# Patient Record
Sex: MALE | Race: BLACK OR AFRICAN AMERICAN | NOT HISPANIC OR LATINO | Employment: UNEMPLOYED | URBAN - METROPOLITAN AREA
[De-identification: names, ages, dates, MRNs, and addresses within clinical notes are randomized per-mention and may not be internally consistent; named-entity substitution may affect disease eponyms.]

---

## 2019-11-21 ENCOUNTER — HISTORICAL (OUTPATIENT)
Dept: LAB | Facility: HOSPITAL | Age: 64
End: 2019-11-21

## 2019-11-21 LAB
ABS NEUT (OLG): 2.49 X10(3)/MCL (ref 2.1–9.2)
ALBUMIN SERPL-MCNC: 4.3 GM/DL (ref 3.4–5)
ALBUMIN/GLOB SERPL: 1.3 {RATIO}
ALP SERPL-CCNC: 77 UNIT/L (ref 50–136)
ALT SERPL-CCNC: 20 UNIT/L (ref 12–78)
APPEARANCE, UA: CLEAR
AST SERPL-CCNC: 99 UNIT/L (ref 15–37)
BACTERIA SPEC CULT: NORMAL /HPF
BASOPHILS # BLD AUTO: 0.1 X10(3)/MCL (ref 0–0.2)
BASOPHILS NFR BLD AUTO: 1 %
BILIRUB SERPL-MCNC: 0.5 MG/DL (ref 0.2–1)
BILIRUB UR QL STRIP: NEGATIVE
BILIRUBIN DIRECT+TOT PNL SERPL-MCNC: 0.1 MG/DL (ref 0–0.2)
BILIRUBIN DIRECT+TOT PNL SERPL-MCNC: 0.4 MG/DL (ref 0–0.8)
BUN SERPL-MCNC: 12 MG/DL (ref 7–18)
CALCIUM SERPL-MCNC: 9.7 MG/DL (ref 8.5–10.1)
CHLORIDE SERPL-SCNC: 104 MMOL/L (ref 98–107)
CHOLEST SERPL-MCNC: 184 MG/DL (ref 0–200)
CHOLEST/HDLC SERPL: 3.4 {RATIO} (ref 0–5)
CO2 SERPL-SCNC: 29 MMOL/L (ref 21–32)
COLOR UR: YELLOW
CREAT SERPL-MCNC: 0.82 MG/DL (ref 0.7–1.3)
DEPRECATED CALCIDIOL+CALCIFEROL SERPL-MC: 11.32 NG/ML (ref 30–80)
EOSINOPHIL # BLD AUTO: 0.1 X10(3)/MCL (ref 0–0.9)
EOSINOPHIL NFR BLD AUTO: 2 %
ERYTHROCYTE [DISTWIDTH] IN BLOOD BY AUTOMATED COUNT: 21.5 % (ref 11.5–17)
EST. AVERAGE GLUCOSE BLD GHB EST-MCNC: 80 MG/DL
GLOBULIN SER-MCNC: 3.2 GM/DL (ref 2.4–3.5)
GLUCOSE (UA): NEGATIVE
GLUCOSE SERPL-MCNC: 96 MG/DL (ref 74–106)
HBA1C MFR BLD: 4.4 % (ref 4.2–6.3)
HCT VFR BLD AUTO: 37.9 % (ref 42–52)
HDLC SERPL-MCNC: 54 MG/DL (ref 35–60)
HGB BLD-MCNC: 10.7 GM/DL (ref 14–18)
HGB UR QL STRIP: NEGATIVE
KETONES UR QL STRIP: NEGATIVE
LDLC SERPL CALC-MCNC: 109 MG/DL (ref 0–129)
LEUKOCYTE ESTERASE UR QL STRIP: NEGATIVE
LYMPHOCYTES # BLD AUTO: 1.7 X10(3)/MCL (ref 0.6–4.6)
LYMPHOCYTES NFR BLD AUTO: 34 %
MCH RBC QN AUTO: 25.5 PG (ref 27–31)
MCHC RBC AUTO-ENTMCNC: 28.2 GM/DL (ref 33–36)
MCV RBC AUTO: 90.5 FL (ref 80–94)
MONOCYTES # BLD AUTO: 0.6 X10(3)/MCL (ref 0.1–1.3)
MONOCYTES NFR BLD AUTO: 11 %
NEUTROPHILS # BLD AUTO: 2.49 X10(3)/MCL (ref 2.1–9.2)
NEUTROPHILS NFR BLD AUTO: 51 %
NITRITE UR QL STRIP: NEGATIVE
PH UR STRIP: 6 [PH] (ref 5–9)
PLATELET # BLD AUTO: 266 X10(3)/MCL (ref 130–400)
PMV BLD AUTO: 10.7 FL (ref 9.4–12.4)
POTASSIUM SERPL-SCNC: 4.1 MMOL/L (ref 3.5–5.1)
PROT SERPL-MCNC: 7.5 GM/DL (ref 6.4–8.2)
PROT UR QL STRIP: NEGATIVE
PSA SERPL-MCNC: 0.39 NG/ML (ref 0–4)
RBC # BLD AUTO: 4.19 X10(6)/MCL (ref 4.7–6.1)
RBC #/AREA URNS HPF: NORMAL /[HPF]
SODIUM SERPL-SCNC: 138 MMOL/L (ref 136–145)
SP GR UR STRIP: 1.01 (ref 1–1.03)
SQUAMOUS EPITHELIAL, UA: NORMAL
TRIGL SERPL-MCNC: 104 MG/DL (ref 30–150)
TSH SERPL-ACNC: 1.51 MIU/L (ref 0.36–3.74)
UROBILINOGEN UR STRIP-ACNC: 0.2
VLDLC SERPL CALC-MCNC: 21 MG/DL
WBC # SPEC AUTO: 4.9 X10(3)/MCL (ref 4.5–11.5)
WBC #/AREA URNS HPF: NORMAL /HPF

## 2020-01-02 ENCOUNTER — HISTORICAL (OUTPATIENT)
Dept: LAB | Facility: HOSPITAL | Age: 65
End: 2020-01-02

## 2020-01-02 LAB
ALBUMIN SERPL-MCNC: 4 GM/DL (ref 3.4–5)
ALBUMIN/GLOB SERPL: 1.2 {RATIO}
ALP SERPL-CCNC: 75 UNIT/L (ref 50–136)
ALT SERPL-CCNC: 19 UNIT/L (ref 12–78)
AST SERPL-CCNC: 94 UNIT/L (ref 15–37)
BILIRUB SERPL-MCNC: 0.4 MG/DL (ref 0.2–1)
BILIRUBIN DIRECT+TOT PNL SERPL-MCNC: 0.1 MG/DL (ref 0–0.2)
BILIRUBIN DIRECT+TOT PNL SERPL-MCNC: 0.3 MG/DL (ref 0–0.8)
BUN SERPL-MCNC: 20 MG/DL (ref 7–18)
CALCIUM SERPL-MCNC: 9.3 MG/DL (ref 8.5–10.1)
CHLORIDE SERPL-SCNC: 108 MMOL/L (ref 98–107)
CO2 SERPL-SCNC: 29 MMOL/L (ref 21–32)
CREAT SERPL-MCNC: 0.96 MG/DL (ref 0.7–1.3)
GLOBULIN SER-MCNC: 3.3 GM/DL (ref 2.4–3.5)
GLUCOSE SERPL-MCNC: 90 MG/DL (ref 74–106)
POTASSIUM SERPL-SCNC: 4.3 MMOL/L (ref 3.5–5.1)
PROT SERPL-MCNC: 7.3 GM/DL (ref 6.4–8.2)
SODIUM SERPL-SCNC: 141 MMOL/L (ref 136–145)

## 2020-07-09 LAB
ALBUMIN SERPL-MCNC: 5 G/DL (ref 3.8–4.8)
ALBUMIN/GLOB SERPL: 1.8 {RATIO} (ref 1.2–2.2)
ALP SERPL-CCNC: 64 IU/L (ref 39–117)
ALT SERPL-CCNC: 10 IU/L (ref 0–44)
AST SERPL-CCNC: 42 IU/L (ref 0–40)
BASOPHILS # BLD AUTO: 0.1 X10E3/UL (ref 0–0.2)
BASOPHILS NFR BLD AUTO: 1 %
BILIRUB SERPL-MCNC: 0.3 MG/DL (ref 0–1.2)
BUN SERPL-MCNC: 13 MG/DL (ref 8–27)
CALCIUM SERPL-MCNC: 9.5 MG/DL (ref 8.6–10.2)
CHLORIDE SERPL-SCNC: 101 MMOL/L (ref 96–106)
CO2 SERPL-SCNC: 24 MMOL/L (ref 20–29)
CREAT SERPL-MCNC: 0.9 MG/DL (ref 0.76–1.27)
DEPRECATED CALCIDIOL+CALCIFEROL SERPL-MC: 26.2 NG/ML (ref 30–100)
EOSINOPHIL # BLD AUTO: 0.1 X10E3/UL (ref 0–0.4)
EOSINOPHIL NFR BLD AUTO: 2 %
ERYTHROCYTE [DISTWIDTH] IN BLOOD BY AUTOMATED COUNT: 15.9 % (ref 11.6–15.4)
GLOBULIN SER-MCNC: 2.8 G/DL (ref 1.5–4.5)
HCT VFR BLD AUTO: 37.9 % (ref 37.5–51)
HEP C VIRUS AB: <0.1
HGB BLD-MCNC: 11.1 G/DL (ref 13–17.7)
IRON SERPL-MCNC: 40 MCG/DL (ref 38–169)
LYMPHOCYTES # BLD AUTO: 2 X10E3/UL (ref 0.7–3.1)
LYMPHOCYTES NFR BLD AUTO: 34 %
MCH RBC QN AUTO: 28.5 PG (ref 26.6–33)
MCHC RBC AUTO-ENTMCNC: 29.3 G/DL (ref 31.5–35.7)
MCV RBC AUTO: 97 FL (ref 79–97)
MONOCYTES # BLD AUTO: 0.7 X10E3/UL (ref 0.1–0.9)
MONOCYTES NFR BLD AUTO: 12 %
NEUTROPHILS # BLD AUTO: 2.9 X10E3/UL (ref 1.4–7)
NEUTROPHILS NFR BLD AUTO: 51 %
PLATELET # BLD AUTO: 275 X10E3/UL (ref 150–450)
POTASSIUM SERPL-SCNC: 4.6 MMOL/L (ref 3.5–5.2)
PROT SERPL-MCNC: 7.8 G/DL (ref 6–8.5)
RBC # BLD AUTO: 3.9 X10(6)/MCL (ref 4.14–5.8)
SODIUM SERPL-SCNC: 140 MMOL/L (ref 134–144)
WBC # SPEC AUTO: 5.7 X10E3/UL (ref 3.4–10.8)

## 2020-08-19 ENCOUNTER — HISTORICAL (OUTPATIENT)
Dept: RADIOLOGY | Facility: HOSPITAL | Age: 65
End: 2020-08-19

## 2022-04-10 ENCOUNTER — HISTORICAL (OUTPATIENT)
Dept: ADMINISTRATIVE | Facility: HOSPITAL | Age: 67
End: 2022-04-10
Payer: MEDICARE

## 2022-04-24 VITALS
DIASTOLIC BLOOD PRESSURE: 76 MMHG | WEIGHT: 127 LBS | OXYGEN SATURATION: 98 % | SYSTOLIC BLOOD PRESSURE: 120 MMHG | HEIGHT: 68 IN | BODY MASS INDEX: 19.25 KG/M2

## 2022-05-05 ENCOUNTER — HISTORICAL (OUTPATIENT)
Dept: ADMINISTRATIVE | Facility: HOSPITAL | Age: 67
End: 2022-05-05
Payer: MEDICARE

## 2022-05-19 RX ORDER — PROMETHAZINE HYDROCHLORIDE AND DEXTROMETHORPHAN HYDROBROMIDE 6.25; 15 MG/5ML; MG/5ML
5 SYRUP ORAL EVERY 6 HOURS PRN
Qty: 120 ML | Refills: 1 | OUTPATIENT
Start: 2022-05-19

## 2022-05-19 RX ORDER — PROMETHAZINE HYDROCHLORIDE AND DEXTROMETHORPHAN HYDROBROMIDE 6.25; 15 MG/5ML; MG/5ML
5 SYRUP ORAL EVERY 6 HOURS PRN
COMMUNITY
End: 2022-05-23 | Stop reason: SDUPTHER

## 2022-05-19 NOTE — TELEPHONE ENCOUNTER
----- Message from Ness Barr sent at 5/19/2022 10:24 AM CDT -----  Patient requesting the cough medicine he uses called out to the pharmacy, unable to tell me the name

## 2022-05-19 NOTE — TELEPHONE ENCOUNTER
RX not refilled. Needs an appt if this is required. He can try OTC dextromethorphan/Doxylamine cough syrup.

## 2022-05-23 ENCOUNTER — CLINICAL SUPPORT (OUTPATIENT)
Dept: FAMILY MEDICINE | Facility: CLINIC | Age: 67
End: 2022-05-23
Payer: MEDICARE

## 2022-05-23 DIAGNOSIS — Z53.21 PROCEDURE AND TREATMENT NOT CARRIED OUT DUE TO PATIENT LEAVING PRIOR TO BEING SEEN BY HEALTH CARE PROVIDER: Primary | ICD-10-CM

## 2022-05-23 RX ORDER — CYCLOBENZAPRINE HCL 5 MG
5 TABLET ORAL NIGHTLY PRN
COMMUNITY
Start: 2021-09-01 | End: 2023-01-13 | Stop reason: SDUPTHER

## 2022-05-23 RX ORDER — HYDROCODONE BITARTRATE AND ACETAMINOPHEN 7.5; 325 MG/1; MG/1
1 TABLET ORAL EVERY 12 HOURS PRN
COMMUNITY
Start: 2022-01-27 | End: 2022-05-23 | Stop reason: SDUPTHER

## 2022-05-23 RX ORDER — FERROUS GLUCONATE 324(38)MG
324 TABLET ORAL
COMMUNITY

## 2022-05-23 RX ORDER — ASPIRIN 81 MG/1
81 TABLET ORAL DAILY
COMMUNITY

## 2022-05-23 RX ORDER — CHOLECALCIFEROL (VITAMIN D3) 50 MCG
TABLET ORAL DAILY
COMMUNITY

## 2022-05-23 RX ORDER — CLOPIDOGREL BISULFATE 75 MG/1
75 TABLET ORAL DAILY
COMMUNITY

## 2022-05-24 RX ORDER — PROMETHAZINE HYDROCHLORIDE AND DEXTROMETHORPHAN HYDROBROMIDE 6.25; 15 MG/5ML; MG/5ML
5 SYRUP ORAL EVERY 6 HOURS PRN
Qty: 180 ML | Refills: 0 | Status: SHIPPED | OUTPATIENT
Start: 2022-05-24 | End: 2022-06-29 | Stop reason: SDUPTHER

## 2022-05-24 RX ORDER — HYDROCODONE BITARTRATE AND ACETAMINOPHEN 7.5; 325 MG/1; MG/1
1 TABLET ORAL EVERY 12 HOURS PRN
Qty: 60 TABLET | Refills: 0 | Status: SHIPPED | OUTPATIENT
Start: 2022-05-24 | End: 2022-06-21 | Stop reason: SDUPTHER

## 2022-06-21 RX ORDER — HYDROCODONE BITARTRATE AND ACETAMINOPHEN 7.5; 325 MG/1; MG/1
1 TABLET ORAL EVERY 12 HOURS PRN
Qty: 60 TABLET | Refills: 0 | Status: SHIPPED | OUTPATIENT
Start: 2022-06-21 | End: 2022-07-20 | Stop reason: SDUPTHER

## 2022-06-29 RX ORDER — PROMETHAZINE HYDROCHLORIDE AND DEXTROMETHORPHAN HYDROBROMIDE 6.25; 15 MG/5ML; MG/5ML
5 SYRUP ORAL EVERY 6 HOURS PRN
Qty: 180 ML | Refills: 0 | Status: SHIPPED | OUTPATIENT
Start: 2022-06-29 | End: 2022-07-18 | Stop reason: SDUPTHER

## 2022-06-29 NOTE — TELEPHONE ENCOUNTER
----- Message from Annabel Jovel Patient Care Assistant sent at 6/29/2022  1:56 PM CDT -----  Regarding: med ref  Type:  RX Refill Request    Who Called: pt  Refill or New Rx: refill  RX Name and Strength: promethazine-dextromethorphan (PROMETHAZINE-DM) 6.25-15 mg/5 mL Syrp  How is the patient currently taking it? (ex. 1XDay):  Take 5 mLs by mouth every 6 (six) hours as needed  Is this a 30 day or 90 day RX: n/a  Preferred Pharmacy with phone number: [facility]  Local or Mail Order: Local  Ordering Provider: Dr. Montoya  Would the patient rather a call back or a response via [facility]? C/b  Best Call Back Number: [phone]  Additional Information: pt is req a refill on this cough medicine please.

## 2022-07-14 RX ORDER — PROMETHAZINE HYDROCHLORIDE AND DEXTROMETHORPHAN HYDROBROMIDE 6.25; 15 MG/5ML; MG/5ML
5 SYRUP ORAL EVERY 6 HOURS PRN
Qty: 180 ML | Refills: 0 | OUTPATIENT
Start: 2022-07-14

## 2022-07-18 DIAGNOSIS — R05.3 CHRONIC COUGH: Primary | ICD-10-CM

## 2022-07-18 RX ORDER — PROMETHAZINE HYDROCHLORIDE AND DEXTROMETHORPHAN HYDROBROMIDE 6.25; 15 MG/5ML; MG/5ML
5 SYRUP ORAL EVERY 6 HOURS PRN
Qty: 180 ML | Refills: 0 | Status: SHIPPED | OUTPATIENT
Start: 2022-07-18 | End: 2023-01-13 | Stop reason: SDUPTHER

## 2022-07-18 NOTE — TELEPHONE ENCOUNTER
----- Message from Gisela Rabago sent at 7/18/2022 11:02 AM CDT -----  Regarding: Pt call  Pt requesting a call back in ref to a Rx for cough medication. Informed pt he will need a appt. He stated no he does not he wants to speak with the nurse. Call back number is [phone]

## 2022-07-20 RX ORDER — HYDROCODONE BITARTRATE AND ACETAMINOPHEN 7.5; 325 MG/1; MG/1
1 TABLET ORAL EVERY 12 HOURS PRN
Qty: 60 TABLET | Refills: 0 | Status: SHIPPED | OUTPATIENT
Start: 2022-07-20 | End: 2022-08-15 | Stop reason: SDUPTHER

## 2022-07-20 NOTE — TELEPHONE ENCOUNTER
----- Message from Gisela Rabago sent at 7/20/2022  1:38 PM CDT -----  Regarding: Med refill  Pt is requesting pain meds to be sent to pharmacy.

## 2022-08-15 DIAGNOSIS — R05.3 CHRONIC COUGH: Primary | ICD-10-CM

## 2022-08-15 RX ORDER — HYDROCODONE BITARTRATE AND ACETAMINOPHEN 7.5; 325 MG/1; MG/1
1 TABLET ORAL EVERY 12 HOURS PRN
Qty: 60 TABLET | Refills: 0 | Status: SHIPPED | OUTPATIENT
Start: 2022-08-15 | End: 2022-09-16 | Stop reason: SDUPTHER

## 2022-08-15 NOTE — TELEPHONE ENCOUNTER
----- Message from De'Giana Rob sent at 8/15/2022 10:36 AM CDT -----  Regarding: rx request  Type:  RX Refill Request    Who Called: pt  Refill or New Rx: refill  RX Name and Strength: HYDROcodone-acetaminophen (NORCO) 7.5-325 mg per tablet  How is the patient currently taking it? (ex. 1XDay): Take 1 tablet by mouth every 12 (twelve) hours as needed.  Is this a 30 day or 90 day RX: 60 tabs   Preferred Pharmacy with phone number: [facility] [place], [place] - [address] [place] [address]  Local or Mail Order: local  Ordering Provider: ade  Would the patient rather a call back or a response via [facility]?  na  Best Call Back Number: [phone]  Additional Information: requesting cold medicine for his cough / congestion.. stated it was a yellow one that he received last time

## 2022-09-16 RX ORDER — HYDROCODONE BITARTRATE AND ACETAMINOPHEN 7.5; 325 MG/1; MG/1
1 TABLET ORAL EVERY 12 HOURS PRN
Qty: 60 TABLET | Refills: 0 | Status: SHIPPED | OUTPATIENT
Start: 2022-09-16 | End: 2022-10-11 | Stop reason: SDUPTHER

## 2022-09-16 NOTE — TELEPHONE ENCOUNTER
----- Message from Rosalinda Montoya MD sent at 9/16/2022 10:03 AM CDT -----  Regarding: RE: refill  His Rx is written for every 12 hours, which is twice a day. Thanks.   ----- Message -----  From: Shmuel Mosher MA  Sent: 9/16/2022   9:33 AM CDT  To: Rosalinda Montoya MD  Subject: FW: refill                                       Please advise on dosage change  ----- Message -----  From: Yeimi Flores  Sent: 9/16/2022   9:16 AM CDT  To: Jan PALMER Staff  Subject: refill                                           Type:  RX Refill Request    Who Called: pt  Refill or New Rx:refill  RX Name and Strength:HYDROcodone-acetaminophen (NORCO) 7.5-325 mg per tablet  How is the patient currently taking it? (ex. 1XDay):2xday  Is this a 30 day or 90 day RX:30  Preferred Pharmacy with phone number:[facility] in [place]   Local or Mail Order:local  Ordering Provider:Rosalinda Montoya  Would the patient rather a call back or a response via [facility]?   Best Call Back Number:[phone]  Additional Information: pt is out the meds. He stated that pcp was suppose to up the dosage so he has been taking 2 a day

## 2022-10-11 RX ORDER — HYDROCODONE BITARTRATE AND ACETAMINOPHEN 7.5; 325 MG/1; MG/1
1 TABLET ORAL EVERY 12 HOURS PRN
Qty: 60 TABLET | Refills: 0 | Status: SHIPPED | OUTPATIENT
Start: 2022-10-11 | End: 2022-11-17 | Stop reason: SDUPTHER

## 2022-10-11 NOTE — TELEPHONE ENCOUNTER
----- Message from Sunil Maximino sent at 10/11/2022  4:20 PM CDT -----  .Type:  RX Refill Request    Who Called: pt  Refill or New Rx: refill  RX Name and Strength:HYDROcodone-acetaminophen (NORCO) 7.5-325 mg per tablet  How is the patient currently taking it? (ex. 1XDay):Route: Take 1 tablet by mouth every 12 (twelve) hours as needed. - Oral  Is this a 30 day or 90 day RX:  Preferred Pharmacy with phone number:[facility] [place] - [address] [place] [address]  Local or Mail Order: local  Ordering Provider:  Would the patient rather a call back or a response via [facility]? Call back  Best Call Back Number: [phone]   Additional Information: patient is stating pharmacy will be closed Saturday and Sunday

## 2022-11-14 RX ORDER — HYDROCODONE BITARTRATE AND ACETAMINOPHEN 7.5; 325 MG/1; MG/1
1 TABLET ORAL EVERY 12 HOURS PRN
Qty: 60 TABLET | Refills: 0 | OUTPATIENT
Start: 2022-11-14

## 2022-11-15 NOTE — TELEPHONE ENCOUNTER
----- Message from IsraGiana Rob sent at 11/15/2022 11:53 AM CST -----  Regarding: Patient Call  Type:  Patient Returning Call    Who Called: pt  Who Left Message for Patient: nurse  Does the patient know what this is regarding?: yes  Would the patient rather a call back or a response via [facility]?  Call back   Best Call Back Number: [phone]  Additional Information:  patient requesting a call back.   Advised him he needed an appointment to get his refill, he refused and stated he will be finding himself another doctor due to lack of communication . Been waiting since last Friday for a call back

## 2022-11-17 RX ORDER — HYDROCODONE BITARTRATE AND ACETAMINOPHEN 7.5; 325 MG/1; MG/1
1 TABLET ORAL EVERY 12 HOURS PRN
Qty: 60 TABLET | Refills: 0 | Status: SHIPPED | OUTPATIENT
Start: 2022-11-17 | End: 2023-01-13 | Stop reason: SDUPTHER

## 2022-11-17 NOTE — TELEPHONE ENCOUNTER
----- Message from Sunil Maximino sent at 11/17/2022  8:33 AM CST -----  .Type:  Needs Medical Advice    Who Called: pt  Would the patient rather a call back or a response via [facility]? Call back  Best Call Back Number: [phone]  Additional Information: pt is calling to see  Today nothing available until 11/23 with Jeanie Awad. Pt's sister has an appointment today is tryignt o be seen at same time pt would not understand that it was not his appt but his sister and he believe he has an appt at that time. He said he was just gonna go to the clinic.

## 2023-01-03 RX ORDER — HYDROCODONE BITARTRATE AND ACETAMINOPHEN 7.5; 325 MG/1; MG/1
1 TABLET ORAL EVERY 12 HOURS PRN
Qty: 60 TABLET | Refills: 0 | OUTPATIENT
Start: 2023-01-03

## 2023-01-13 ENCOUNTER — OFFICE VISIT (OUTPATIENT)
Dept: FAMILY MEDICINE | Facility: CLINIC | Age: 68
End: 2023-01-13
Payer: MEDICARE

## 2023-01-13 VITALS
HEART RATE: 73 BPM | BODY MASS INDEX: 19.36 KG/M2 | OXYGEN SATURATION: 99 % | WEIGHT: 127.31 LBS | SYSTOLIC BLOOD PRESSURE: 129 MMHG | TEMPERATURE: 98 F | DIASTOLIC BLOOD PRESSURE: 83 MMHG

## 2023-01-13 DIAGNOSIS — Z12.11 SCREENING FOR COLON CANCER: ICD-10-CM

## 2023-01-13 DIAGNOSIS — G89.29 CHRONIC LEFT SHOULDER PAIN: ICD-10-CM

## 2023-01-13 DIAGNOSIS — I73.9 PERIPHERAL VASCULAR DISEASE, UNSPECIFIED: ICD-10-CM

## 2023-01-13 DIAGNOSIS — M25.512 CHRONIC LEFT SHOULDER PAIN: ICD-10-CM

## 2023-01-13 DIAGNOSIS — J44.9 CHRONIC OBSTRUCTIVE PULMONARY DISEASE, UNSPECIFIED COPD TYPE: ICD-10-CM

## 2023-01-13 DIAGNOSIS — M47.22 CERVICAL RADICULOPATHY DUE TO DEGENERATIVE JOINT DISEASE OF SPINE: Primary | ICD-10-CM

## 2023-01-13 DIAGNOSIS — Z72.0 TOBACCO USE: ICD-10-CM

## 2023-01-13 PROCEDURE — 99406 PR TOBACCO USE CESSATION INTERMEDIATE 3-10 MINUTES: ICD-10-PCS | Mod: ,,, | Performed by: FAMILY MEDICINE

## 2023-01-13 PROCEDURE — 1160F PR REVIEW ALL MEDS BY PRESCRIBER/CLIN PHARMACIST DOCUMENTED: ICD-10-PCS | Mod: CPTII,,, | Performed by: FAMILY MEDICINE

## 2023-01-13 PROCEDURE — 3288F PR FALLS RISK ASSESSMENT DOCUMENTED: ICD-10-PCS | Mod: CPTII,,, | Performed by: FAMILY MEDICINE

## 2023-01-13 PROCEDURE — 3288F FALL RISK ASSESSMENT DOCD: CPT | Mod: CPTII,,, | Performed by: FAMILY MEDICINE

## 2023-01-13 PROCEDURE — 1124F PR ADV CARE PLAN DISCUSSED, UNABLE/UNWILL DOC PLAN OR SURROGATE: ICD-10-PCS | Mod: CPTII,,, | Performed by: FAMILY MEDICINE

## 2023-01-13 PROCEDURE — 3079F PR MOST RECENT DIASTOLIC BLOOD PRESSURE 80-89 MM HG: ICD-10-PCS | Mod: CPTII,,, | Performed by: FAMILY MEDICINE

## 2023-01-13 PROCEDURE — 1101F PR PT FALLS ASSESS DOC 0-1 FALLS W/OUT INJ PAST YR: ICD-10-PCS | Mod: CPTII,,, | Performed by: FAMILY MEDICINE

## 2023-01-13 PROCEDURE — 3008F BODY MASS INDEX DOCD: CPT | Mod: CPTII,,, | Performed by: FAMILY MEDICINE

## 2023-01-13 PROCEDURE — 1160F RVW MEDS BY RX/DR IN RCRD: CPT | Mod: CPTII,,, | Performed by: FAMILY MEDICINE

## 2023-01-13 PROCEDURE — 99406 BEHAV CHNG SMOKING 3-10 MIN: CPT | Mod: ,,, | Performed by: FAMILY MEDICINE

## 2023-01-13 PROCEDURE — 3008F PR BODY MASS INDEX (BMI) DOCUMENTED: ICD-10-PCS | Mod: CPTII,,, | Performed by: FAMILY MEDICINE

## 2023-01-13 PROCEDURE — 3079F DIAST BP 80-89 MM HG: CPT | Mod: CPTII,,, | Performed by: FAMILY MEDICINE

## 2023-01-13 PROCEDURE — 1125F AMNT PAIN NOTED PAIN PRSNT: CPT | Mod: CPTII,,, | Performed by: FAMILY MEDICINE

## 2023-01-13 PROCEDURE — 1159F PR MEDICATION LIST DOCUMENTED IN MEDICAL RECORD: ICD-10-PCS | Mod: CPTII,,, | Performed by: FAMILY MEDICINE

## 2023-01-13 PROCEDURE — 1125F PR PAIN SEVERITY QUANTIFIED, PAIN PRESENT: ICD-10-PCS | Mod: CPTII,,, | Performed by: FAMILY MEDICINE

## 2023-01-13 PROCEDURE — 99214 OFFICE O/P EST MOD 30 MIN: CPT | Mod: ,,, | Performed by: FAMILY MEDICINE

## 2023-01-13 PROCEDURE — 1159F MED LIST DOCD IN RCRD: CPT | Mod: CPTII,,, | Performed by: FAMILY MEDICINE

## 2023-01-13 PROCEDURE — 1124F ACP DISCUSS-NO DSCNMKR DOCD: CPT | Mod: CPTII,,, | Performed by: FAMILY MEDICINE

## 2023-01-13 PROCEDURE — 3074F PR MOST RECENT SYSTOLIC BLOOD PRESSURE < 130 MM HG: ICD-10-PCS | Mod: CPTII,,, | Performed by: FAMILY MEDICINE

## 2023-01-13 PROCEDURE — 3074F SYST BP LT 130 MM HG: CPT | Mod: CPTII,,, | Performed by: FAMILY MEDICINE

## 2023-01-13 PROCEDURE — 99214 PR OFFICE/OUTPT VISIT, EST, LEVL IV, 30-39 MIN: ICD-10-PCS | Mod: ,,, | Performed by: FAMILY MEDICINE

## 2023-01-13 PROCEDURE — 1101F PT FALLS ASSESS-DOCD LE1/YR: CPT | Mod: CPTII,,, | Performed by: FAMILY MEDICINE

## 2023-01-13 RX ORDER — HYDROCODONE BITARTRATE AND ACETAMINOPHEN 7.5; 325 MG/1; MG/1
1 TABLET ORAL EVERY 12 HOURS PRN
Qty: 60 TABLET | Refills: 0 | Status: SHIPPED | OUTPATIENT
Start: 2023-01-13 | End: 2023-02-10 | Stop reason: SDUPTHER

## 2023-01-13 RX ORDER — CYCLOBENZAPRINE HCL 5 MG
5 TABLET ORAL NIGHTLY PRN
Qty: 30 TABLET | Refills: 2 | Status: SHIPPED | OUTPATIENT
Start: 2023-01-13 | End: 2023-04-13

## 2023-01-13 RX ORDER — PROMETHAZINE HYDROCHLORIDE AND DEXTROMETHORPHAN HYDROBROMIDE 6.25; 15 MG/5ML; MG/5ML
5 SYRUP ORAL EVERY 6 HOURS PRN
Qty: 180 ML | Refills: 0 | Status: SHIPPED | OUTPATIENT
Start: 2023-01-13 | End: 2023-03-08 | Stop reason: SDUPTHER

## 2023-01-13 NOTE — PROGRESS NOTES
Patient ID: [id number]     Chief Complaint: Medication Refill        HPI:     Yogesh Soto is a 67 y.o. male here today with history of chronic neck and left shoulder x > 2 years here for followup neck pain and left shoulder. He reports that he rode horses back in the day and a horse fell on him in the past. Pain is controlled with Norco p.r.n. and he feels like he is at his baseline, he needs refill of Rx today, pain level is 9/10 on pain scale in neck and 4-5/10 on pain scale in left shoulder, intermittent, worse with activity, pain is sharp and throbbing, worse with standing and raising his arm as well, he has been referred to Spine Ortho, but cannot get an appt. scheduled due to COVID-19 pandemic, he cancelled pain management appt. due to him feeling good with current regimen and wants to limit his COVID-19 exposure. He is doing well with Flexeril p.r.n., he needs refill of Rx today. He doesn't like the way the Gabapentin makes him feel, no longer takes that Rx. He has severe DJD C-spine and left rotator cuff tear, but he refuses surgery, risks of refusal discussed with patient and patient voices understanding. He has tried PT in the past without resolution of symptoms.  - Patient has a history of COPD, complaining of cough with clear sputum production x several month. He denies fever, chills, SOB, wheezing, hemoptysis, chest pain, palpitations. He is not on any inhalers at this time, supposed to be taking Trelegy. He reports promethazine DM works well, he needs Rx refill today.. Hasn't had CXR or PFTs recently. He reports tobacco use 2-3 cigarettes day x 40 years,he is not ready to quit at this time, risks of refusal discussed with patient voicing understanding, but he is interested in smoking cessation program. He does not meet the criteria for LDCT lung cancer screening.  - History of PAD s/p PTA right leg with Cardiology (Dr. Rutherford), controlled with Rx, no side effects, asymptomatic, he is requesting  referral to Cardiology in [place] because it is closer to his home.   - He needs Cologuard.  - He refuses flu vaccine, Prevnar 13, Pneumovax, Shingrix, and COVID-19 booster.  - He is scheduled for wellness visit in 03/2023 and will wait for that appointment for labs.  - Patient is without any other complaints today.     Advance Care Planning     Date: 01/13/2023  Patient did not wish or was not able to name a surrogate decision maker or provide an Advance Care Plan.          ----------------------------  Cervical radiculopathy  Insomnia  Osteopenia  PVD (peripheral vascular disease)     Past Surgical History:   Procedure Laterality Date    CEREBRAL ANGIOGRAM         Review of patient's allergies indicates:  No Known Allergies    Outpatient Medications Marked as Taking for the 1/13/23 encounter (Office Visit) with Rosalinda Montoya MD   Medication Sig Dispense Refill    [DISCONTINUED] cyclobenzaprine (FLEXERIL) 5 MG tablet Take 5 mg by mouth nightly as needed.      [DISCONTINUED] HYDROcodone-acetaminophen (NORCO) 7.5-325 mg per tablet Take 1 tablet by mouth every 12 (twelve) hours as needed. 60 tablet 0    [DISCONTINUED] promethazine-dextromethorphan (PROMETHAZINE-DM) 6.25-15 mg/5 mL Syrp Take 5 mLs by mouth every 6 (six) hours as needed. 180 mL 0       Social History     Socioeconomic History    Marital status: Single   Tobacco Use    Smoking status: Every Day     Types: Cigarettes    Smokeless tobacco: Never   Substance and Sexual Activity    Alcohol use: Yes    Drug use: Never    Sexual activity: Not Currently   Social History Narrative    ** Merged History Encounter **             Family History   Problem Relation Age of Onset    Hypertension Mother     Hypertension Brother         Subjective:       Review of Systems:    See HPI for details    Constitutional: No fever, No chills, No fatigue.   Eye: No blurring, No visual disturbances.   Ear/Nose/Mouth/Throat: No decreased hearing, No ear pain, No nasal  congestion, No sore throat.   Respiratory: No shortness of breath, Cough, No wheezing.   Cardiovascular: No chest pain, No palpitations, No peripheral edema.   Gastrointestinal: No nausea, No vomiting, No diarrhea, No constipation, No abdominal pain.   Genitourinary: No dysuria, No hematuria.   Hematology/Lymphatics: No bruising tendency, No bleeding tendency, No swollen lymph glands.   Endocrine: No excessive thirst, No polyuria, No excessive hunger.   Musculoskeletal: As per HPI; No muscle pain, No decreased range of motion.   Integumentary: No rash, No pruritus.   Neurologic: As per HPI; No abnormal balance, No confusion, No headache.   Psychiatric: No anxiety, No depression, Not suicidal, No hallucinations.     All Other ROS: Negative except as stated in HPI.       Objective:     /83 (BP Location: Left arm, Patient Position: Sitting, BP Method: Medium (Automatic))   Pulse 73   Temp 98.1 °F (36.7 °C)   Wt 57.7 kg (127 lb 4.8 oz)   SpO2 99%   BMI 19.36 kg/m²     Physical Exam    General: Alert and oriented, No acute distress.   Eye: Pupils are equal, round and reactive to light, Normal conjunctiva.   HENT: Normocephalic, Tympanic membranes are clear, Normal hearing, Oral mucosa is moist, No pharyngeal erythema.   Throat: Pharynx ( Not edematous, No exudate ).   Neck: Supple, Non-tender, No carotid bruit, No lymphadenopathy, No thyromegaly.   Respiratory: Dry cough, Lungs are clear to auscultation, Respirations are non-labored, Breath sounds are equal, Symmetrical chest wall expansion, No chest wall tenderness.   Cardiovascular: Normal rate, Regular rhythm, No murmur, Good pulses equal in all extremities, No edema.   Gastrointestinal: Soft, Non-tender, Non-distended, Normal bowel sounds, No organomegaly.   Genitourinary: No costovertebral angle tenderness.   Musculoskeletal: Normal range of motion, Normal gait. Bilateral cervical spine with mild posterior TTP, no erythema, no effusion, no deformity,  bilateral trapezius muscle spasms. Left shoulder with decreased abduction due to pain, no erythema, no effusion, no deformity, no crepitus. Bilateral lumbar with mild posterior TTP, no effusion, no deformity, bilateral paraspinal muscle spasms, bilateral straight leg raise.  Neurologic: No focal deficits, Cranial Nerves II-XII are grossly intact.   Psychiatric: Cooperative, Appropriate mood & affect, Normal judgment, Non-suicidal.   Mood and affect: Calm.   Behavior: Relaxed.   Tobacco use Counseling    Smoking Cessation History: Not obtained at triage    Discussion included:  Past attempts and outcomes: None.  Other smokers in the house: No?    Assessment:  Readiness to Quit: Not interested in quitting    Plan:  Quit date: None.  Counseling  Time spent: 3 to 10 minutes?  Topics covered:  Tobacco proof home and car-Yes?  Changing daily routines-Yes?  Dealing with urges to smoke/avoiding triggers-Yes?  Getting support- Yes?  Teach behavior skills- Yes?  Reinforce benefits- Yes?  Other-   Pharmacotherapy: Not indicated?  Education provided: Patient education selected. Offered at check out & available in the patient portal.?  Follow up plan: See orders.         Assessment:       ICD-10-CM ICD-9-CM   1. Cervical radiculopathy due to degenerative joint disease of spine  M47.22 721.0   2. Chronic left shoulder pain  M25.512 719.41    G89.29 338.29   3. Peripheral vascular disease, unspecified  I73.9 443.9   4. Chronic obstructive pulmonary disease, unspecified COPD type  J44.9 496   5. Screening for colon cancer  Z12.11 V76.51   6. Tobacco use  Z72.0 305.1        Plan:     Problem List Items Addressed This Visit          Pulmonary    Chronic obstructive pulmonary disease, unspecified COPD type    Relevant Medications    fluticasone-umeclidin-vilanter (TRELEGY ELLIPTA) 100-62.5-25 mcg DsDv    promethazine-dextromethorphan (PROMETHAZINE-DM) 6.25-15 mg/5 mL Syrp    Other Relevant Orders    X-Ray Chest PA And Lateral     Complete PFT w/ bronchodilator       Cardiac/Vascular    Peripheral vascular disease, unspecified    Relevant Orders    Ambulatory referral/consult to Cardiology     Other Visit Diagnoses       Cervical radiculopathy due to degenerative joint disease of spine    -  Primary    Relevant Medications    cyclobenzaprine (FLEXERIL) 5 MG tablet    HYDROcodone-acetaminophen (NORCO) 7.5-325 mg per tablet    Chronic left shoulder pain        Relevant Medications    cyclobenzaprine (FLEXERIL) 5 MG tablet    HYDROcodone-acetaminophen (NORCO) 7.5-325 mg per tablet    Screening for colon cancer        Relevant Orders    Cologuard Screening (Multitarget Stool DNA)    Tobacco use        Relevant Orders    Ambulatory referral/consult to Smoking Cessation Program         1. Cervical radiculopathy due to degenerative joint disease of spine  - cyclobenzaprine (FLEXERIL) 5 MG tablet; Take 1 tablet (5 mg total) by mouth nightly as needed for Muscle spasms.  Dispense: 30 tablet; Refill: 2  - HYDROcodone-acetaminophen (NORCO) 7.5-325 mg per tablet; Take 1 tablet by mouth every 12 (twelve) hours as needed for Pain.  Dispense: 60 tablet; Refill: 0  -Continue Norco 7.5mg po x q12hrs p.r.n. with close monitoring as well as Flexeril, both Rx refilled today.  reviewed today. UDS at next visit. Stretching exercises encouraged. Notify M.D. or ER if symptoms persist or worsen, SI/HI, temp greater than 100.4, or any acute illness.     2. Chronic left shoulder pain  - cyclobenzaprine (FLEXERIL) 5 MG tablet; Take 1 tablet (5 mg total) by mouth nightly as needed for Muscle spasms.  Dispense: 30 tablet; Refill: 2  - HYDROcodone-acetaminophen (NORCO) 7.5-325 mg per tablet; Take 1 tablet by mouth every 12 (twelve) hours as needed for Pain.  Dispense: 60 tablet; Refill: 0  - Same as #1.    3. Peripheral vascular disease, unspecified  - Ambulatory referral/consult to Cardiology; Future for evaluation and treatment, continue current treatment plan for  now.    4. Chronic obstructive pulmonary disease, unspecified COPD type  - fluticasone-umeclidin-vilanter (TRELEGY ELLIPTA) 100-62.5-25 mcg DsDv; Inhale 1 puff into the lungs once daily.  Dispense: 60 each; Refill: 5  - promethazine-dextromethorphan (PROMETHAZINE-DM) 6.25-15 mg/5 mL Syrp; Take 5 mLs by mouth every 6 (six) hours as needed (cough).  Dispense: 180 mL; Refill: 0  - X-Ray Chest PA And Lateral; Future  - Complete PFT w/ bronchodilator; Future  - X-Ray Chest PA And Lateral  - Complete PFT w/ bronchodilator  - CXR, PFTs ordered. Rx trial of Trelegy, Promethazine DM sent. Smoking cessation encouraged. Notify M.D. or ER if symptoms persist or worsen, chest pain, palptiations, SOB, wheezing, hemoptysis, temp greater than 100.4, or any acute illness.     5. Screening for colon cancer  - Cologuard Screening (Multitarget Stool DNA); Future  - Cologuard Screening (Multitarget Stool DNA)    6. Tobacco use  - Ambulatory referral/consult to Smoking Cessation Program; Future  - Smoking cessation encouraged.      Yogesh was seen today for medication refill.    Diagnoses and all orders for this visit:    Cervical radiculopathy due to degenerative joint disease of spine  -     cyclobenzaprine (FLEXERIL) 5 MG tablet; Take 1 tablet (5 mg total) by mouth nightly as needed for Muscle spasms.  -     HYDROcodone-acetaminophen (NORCO) 7.5-325 mg per tablet; Take 1 tablet by mouth every 12 (twelve) hours as needed for Pain.    Chronic left shoulder pain  -     cyclobenzaprine (FLEXERIL) 5 MG tablet; Take 1 tablet (5 mg total) by mouth nightly as needed for Muscle spasms.  -     HYDROcodone-acetaminophen (NORCO) 7.5-325 mg per tablet; Take 1 tablet by mouth every 12 (twelve) hours as needed for Pain.    Peripheral vascular disease, unspecified  -     Ambulatory referral/consult to Cardiology; Future    Chronic obstructive pulmonary disease, unspecified COPD type  -     fluticasone-umeclidin-vilanter (TRELEGY ELLIPTA) 100-62.5-25  mcg DsDv; Inhale 1 puff into the lungs once daily.  -     promethazine-dextromethorphan (PROMETHAZINE-DM) 6.25-15 mg/5 mL Syrp; Take 5 mLs by mouth every 6 (six) hours as needed (cough).  -     X-Ray Chest PA And Lateral; Future  -     Complete PFT w/ bronchodilator; Future  -     X-Ray Chest PA And Lateral  -     Complete PFT w/ bronchodilator    Screening for colon cancer  -     Cologuard Screening (Multitarget Stool DNA); Future  -     Cologuard Screening (Multitarget Stool DNA)    Tobacco use  -     Ambulatory referral/consult to Smoking Cessation Program; Future          Medication List with Changes/Refills   Current Medications    ASPIRIN (ECOTRIN) 81 MG EC TABLET    Take 81 mg by mouth once daily.       Start Date: --        End Date: --    CHOLECALCIFEROL, VITAMIN D3, (VITAMIN D3) 50 MCG (2,000 UNIT) TAB    Take by mouth once daily.       Start Date: --        End Date: --    CLOPIDOGREL (PLAVIX) 75 MG TABLET    Take 75 mg by mouth once daily.       Start Date: --        End Date: --    FERROUS GLUCONATE (FERGON) 324 MG TABLET    Take 324 mg by mouth daily with breakfast.       Start Date: --        End Date: --   Changed and/or Refilled Medications    Modified Medication Previous Medication    CYCLOBENZAPRINE (FLEXERIL) 5 MG TABLET cyclobenzaprine (FLEXERIL) 5 MG tablet       Take 1 tablet (5 mg total) by mouth nightly as needed for Muscle spasms.    Take 5 mg by mouth nightly as needed.       Start Date: 1/13/2023 End Date: 4/13/2023    Start Date: 9/1/2021  End Date: 1/13/2023    FLUTICASONE-UMECLIDIN-VILANTER (TRELEGY ELLIPTA) 100-62.5-25 MCG DSDV fluticasone-umeclidin-vilanter (TRELEGY ELLIPTA) 100-62.5-25 mcg DsDv       Inhale 1 puff into the lungs once daily.    Inhale 1 puff into the lungs once daily.       Start Date: 1/13/2023 End Date: 1/13/2024    Start Date: 2/24/2022 End Date: 1/13/2023    HYDROCODONE-ACETAMINOPHEN (NORCO) 7.5-325 MG PER TABLET HYDROcodone-acetaminophen (NORCO) 7.5-325 mg per  tablet       Take 1 tablet by mouth every 12 (twelve) hours as needed for Pain.    Take 1 tablet by mouth every 12 (twelve) hours as needed.       Start Date: 1/13/2023 End Date: 2/12/2023    Start Date: 11/17/2022End Date: 1/13/2023    PROMETHAZINE-DEXTROMETHORPHAN (PROMETHAZINE-DM) 6.25-15 MG/5 ML SYRP promethazine-dextromethorphan (PROMETHAZINE-DM) 6.25-15 mg/5 mL Syrp       Take 5 mLs by mouth every 6 (six) hours as needed (cough).    Take 5 mLs by mouth every 6 (six) hours as needed.       Start Date: 1/13/2023 End Date: --    Start Date: 7/18/2022 End Date: 1/13/2023          Follow up keep wellness appointment as scheduled for 03/2023 or sooner if symptoms worsen or fail to improve.

## 2023-01-25 ENCOUNTER — TELEPHONE (OUTPATIENT)
Dept: FAMILY MEDICINE | Facility: CLINIC | Age: 68
End: 2023-01-25
Payer: MEDICARE

## 2023-02-10 DIAGNOSIS — M47.22 CERVICAL RADICULOPATHY DUE TO DEGENERATIVE JOINT DISEASE OF SPINE: ICD-10-CM

## 2023-02-10 DIAGNOSIS — M25.512 CHRONIC LEFT SHOULDER PAIN: ICD-10-CM

## 2023-02-10 DIAGNOSIS — G89.29 CHRONIC LEFT SHOULDER PAIN: ICD-10-CM

## 2023-02-10 RX ORDER — HYDROCODONE BITARTRATE AND ACETAMINOPHEN 7.5; 325 MG/1; MG/1
1 TABLET ORAL EVERY 12 HOURS PRN
Qty: 60 TABLET | Refills: 0 | Status: SHIPPED | OUTPATIENT
Start: 2023-02-10 | End: 2023-03-08 | Stop reason: SDUPTHER

## 2023-02-10 NOTE — TELEPHONE ENCOUNTER
----- Message from Sapphire Faith sent at 2/10/2023  9:35 AM CST -----  Regarding: Med Refill   Pt needs a refill on [place] 7.5 MG. [facility] pharmacy in [place].

## 2023-03-08 DIAGNOSIS — M47.22 CERVICAL RADICULOPATHY DUE TO DEGENERATIVE JOINT DISEASE OF SPINE: ICD-10-CM

## 2023-03-08 DIAGNOSIS — G89.29 CHRONIC LEFT SHOULDER PAIN: ICD-10-CM

## 2023-03-08 DIAGNOSIS — M25.512 CHRONIC LEFT SHOULDER PAIN: ICD-10-CM

## 2023-03-08 DIAGNOSIS — J44.9 CHRONIC OBSTRUCTIVE PULMONARY DISEASE, UNSPECIFIED COPD TYPE: ICD-10-CM

## 2023-03-08 RX ORDER — PROMETHAZINE HYDROCHLORIDE AND DEXTROMETHORPHAN HYDROBROMIDE 6.25; 15 MG/5ML; MG/5ML
5 SYRUP ORAL EVERY 6 HOURS PRN
Qty: 180 ML | Refills: 0 | Status: SHIPPED | OUTPATIENT
Start: 2023-03-08 | End: 2023-05-08 | Stop reason: SDUPTHER

## 2023-03-08 RX ORDER — HYDROCODONE BITARTRATE AND ACETAMINOPHEN 7.5; 325 MG/1; MG/1
1 TABLET ORAL EVERY 12 HOURS PRN
Qty: 60 TABLET | Refills: 0 | Status: SHIPPED | OUTPATIENT
Start: 2023-03-08 | End: 2023-04-06 | Stop reason: SDUPTHER

## 2023-03-08 NOTE — TELEPHONE ENCOUNTER
----- Message from Zay Ceron sent at 3/8/2023 10:02 AM CST -----  .Type:  RX Refill Request    Who Called: Yogesh Guo or New Rx: Refill   RX Name and Strength: HYDROcodone-acetaminophen (NORCO) 7.5-325 mg per tablet, also he would like the cough medicine refilled as well.    How is the patient currently taking it? (ex. 1XDay):  Is this a 30 day or 90 day RX:  Preferred Pharmacy with phone number: [facility]  Local or Mail Order: Local   Ordering Provider: Jan  Would the patient rather a call back or a response via [facility]?   Best Call Back Number: [phone]  Additional Information:

## 2023-04-05 DIAGNOSIS — M47.22 CERVICAL RADICULOPATHY DUE TO DEGENERATIVE JOINT DISEASE OF SPINE: ICD-10-CM

## 2023-04-05 DIAGNOSIS — G89.29 CHRONIC LEFT SHOULDER PAIN: ICD-10-CM

## 2023-04-05 DIAGNOSIS — M25.512 CHRONIC LEFT SHOULDER PAIN: ICD-10-CM

## 2023-04-05 DIAGNOSIS — J44.9 CHRONIC OBSTRUCTIVE PULMONARY DISEASE, UNSPECIFIED COPD TYPE: ICD-10-CM

## 2023-04-05 RX ORDER — PROMETHAZINE HYDROCHLORIDE AND DEXTROMETHORPHAN HYDROBROMIDE 6.25; 15 MG/5ML; MG/5ML
5 SYRUP ORAL EVERY 6 HOURS PRN
Qty: 180 ML | Refills: 0 | OUTPATIENT
Start: 2023-04-05

## 2023-04-05 RX ORDER — HYDROCODONE BITARTRATE AND ACETAMINOPHEN 7.5; 325 MG/1; MG/1
1 TABLET ORAL EVERY 12 HOURS PRN
Qty: 60 TABLET | Refills: 0 | OUTPATIENT
Start: 2023-04-05 | End: 2023-05-05

## 2023-04-06 DIAGNOSIS — M25.512 CHRONIC LEFT SHOULDER PAIN: ICD-10-CM

## 2023-04-06 DIAGNOSIS — G89.29 CHRONIC LEFT SHOULDER PAIN: ICD-10-CM

## 2023-04-06 DIAGNOSIS — M47.22 CERVICAL RADICULOPATHY DUE TO DEGENERATIVE JOINT DISEASE OF SPINE: ICD-10-CM

## 2023-04-06 RX ORDER — HYDROCODONE BITARTRATE AND ACETAMINOPHEN 7.5; 325 MG/1; MG/1
1 TABLET ORAL EVERY 12 HOURS PRN
Qty: 60 TABLET | Refills: 0 | Status: SHIPPED | OUTPATIENT
Start: 2023-04-06 | End: 2023-05-05 | Stop reason: SDUPTHER

## 2023-04-06 RX ORDER — HYDROCODONE BITARTRATE AND ACETAMINOPHEN 7.5; 325 MG/1; MG/1
1 TABLET ORAL EVERY 12 HOURS PRN
Qty: 60 TABLET | Refills: 0 | OUTPATIENT
Start: 2023-04-06 | End: 2023-05-06

## 2023-04-11 ENCOUNTER — TELEPHONE (OUTPATIENT)
Dept: FAMILY MEDICINE | Facility: CLINIC | Age: 68
End: 2023-04-11
Payer: MEDICARE

## 2023-04-11 NOTE — TELEPHONE ENCOUNTER
----- Message from Zay Ceron sent at 4/11/2023 12:19 PM CDT -----  .Type:  Needs Medical Advice    Who Called: Yogesh   Symptoms (please be specific):    How long has patient had these symptoms:    Pharmacy name and phone #:  [facility]  Would the patient rather a call back or a response via [facility]?   Best Call Back Number: [phone]  Additional Information: Patient has a question about his medication that was sent to [place] he told me.  Please give him a call back.  The [facility] is located in [place].  clopidogreL (PLAVIX) 75 mg tablet and cholecalciferol, vitamin D3, (VITAMIN D3) 50 mcg (2,000 unit) Tab

## 2023-05-05 DIAGNOSIS — M47.22 CERVICAL RADICULOPATHY DUE TO DEGENERATIVE JOINT DISEASE OF SPINE: ICD-10-CM

## 2023-05-05 DIAGNOSIS — G89.29 CHRONIC LEFT SHOULDER PAIN: ICD-10-CM

## 2023-05-05 DIAGNOSIS — M25.512 CHRONIC LEFT SHOULDER PAIN: ICD-10-CM

## 2023-05-05 RX ORDER — HYDROCODONE BITARTRATE AND ACETAMINOPHEN 7.5; 325 MG/1; MG/1
1 TABLET ORAL EVERY 12 HOURS PRN
Qty: 60 TABLET | Refills: 0 | Status: SHIPPED | OUTPATIENT
Start: 2023-05-05 | End: 2023-05-08 | Stop reason: SDUPTHER

## 2023-05-05 NOTE — TELEPHONE ENCOUNTER
----- Message from Zay Ceron sent at 5/5/2023  8:45 AM CDT -----  .Type:  RX Refill Request    Who Called: Yogesh Guo or New Rx: Refill   RX Name and Strength:HYDROcodone-acetaminophen (NORCO) 7.5-325 mg per tablet  How is the patient currently taking it? (ex. 1XDay):  Is this a 30 day or 90 day RX:  Preferred Pharmacy with phone number: [facility]   Local or Mail Order: Local   Ordering Provider: Jan  Would the patient rather a call back or a response via [facility]?   Best Call Back Number: [phone]  Additional Information: He would like it done today.

## 2023-05-08 DIAGNOSIS — M47.22 CERVICAL RADICULOPATHY DUE TO DEGENERATIVE JOINT DISEASE OF SPINE: ICD-10-CM

## 2023-05-08 DIAGNOSIS — G89.29 CHRONIC LEFT SHOULDER PAIN: ICD-10-CM

## 2023-05-08 DIAGNOSIS — M25.512 CHRONIC LEFT SHOULDER PAIN: ICD-10-CM

## 2023-05-08 DIAGNOSIS — J44.9 CHRONIC OBSTRUCTIVE PULMONARY DISEASE, UNSPECIFIED COPD TYPE: ICD-10-CM

## 2023-05-08 RX ORDER — PROMETHAZINE HYDROCHLORIDE AND DEXTROMETHORPHAN HYDROBROMIDE 6.25; 15 MG/5ML; MG/5ML
5 SYRUP ORAL EVERY 6 HOURS PRN
Qty: 180 ML | Refills: 0 | Status: SHIPPED | OUTPATIENT
Start: 2023-05-08 | End: 2023-06-06 | Stop reason: SDUPTHER

## 2023-05-08 RX ORDER — HYDROCODONE BITARTRATE AND ACETAMINOPHEN 7.5; 325 MG/1; MG/1
1 TABLET ORAL EVERY 12 HOURS PRN
Qty: 60 TABLET | Refills: 0 | Status: SHIPPED | OUTPATIENT
Start: 2023-05-08 | End: 2023-06-06 | Stop reason: SDUPTHER

## 2023-05-08 NOTE — TELEPHONE ENCOUNTER
----- Message from Jennifer Carey sent at 5/8/2023  8:55 AM CDT -----  Regarding: med adv  Type:  Needs Medical Advice    Who Called: Yogesh  Symptoms (please be specific): Back pain  Pharmacy name and phone #:  [facility] [place], [place]  Would the patient rather a call back or a response via [facility]?   Best Call Back Number: [phone]  Additional Information: [facility] sends a generic for Norco and patient states he gets a reaction taking the generic brand. Also patient would like cough syrup sent to [facility] as well. Please resubmit [place] rx to [facility] in [place], [place]

## 2023-06-06 DIAGNOSIS — G89.29 CHRONIC LEFT SHOULDER PAIN: ICD-10-CM

## 2023-06-06 DIAGNOSIS — M47.22 CERVICAL RADICULOPATHY DUE TO DEGENERATIVE JOINT DISEASE OF SPINE: ICD-10-CM

## 2023-06-06 DIAGNOSIS — M25.512 CHRONIC LEFT SHOULDER PAIN: ICD-10-CM

## 2023-06-06 DIAGNOSIS — J44.9 CHRONIC OBSTRUCTIVE PULMONARY DISEASE, UNSPECIFIED COPD TYPE: ICD-10-CM

## 2023-06-06 RX ORDER — PROMETHAZINE HYDROCHLORIDE AND DEXTROMETHORPHAN HYDROBROMIDE 6.25; 15 MG/5ML; MG/5ML
5 SYRUP ORAL EVERY 6 HOURS PRN
Qty: 180 ML | Refills: 0 | Status: SHIPPED | OUTPATIENT
Start: 2023-06-06 | End: 2023-07-17 | Stop reason: SDUPTHER

## 2023-06-06 RX ORDER — HYDROCODONE BITARTRATE AND ACETAMINOPHEN 7.5; 325 MG/1; MG/1
1 TABLET ORAL EVERY 12 HOURS PRN
Qty: 30 TABLET | Refills: 0 | Status: SHIPPED | OUTPATIENT
Start: 2023-06-06 | End: 2023-06-21 | Stop reason: SDUPTHER

## 2023-06-06 NOTE — TELEPHONE ENCOUNTER
----- Message from Sunil Stanton sent at 6/6/2023  1:17 PM CDT -----  .Type:  RX Refill Request    Who Called: pt  Refill or New Rx:refill  RX Name and Strength:HYDROcodone-acetaminophen (NORCO) 7.5-325 mg per tablet  How is the patient currently taking it? (ex. 1XDay):Take 1 tablet by mouth every 12 (twelve) hours as needed for Pain. - Oral  Is this a 30 day or 90 day RX: 30 day  Preferred Pharmacy with phone number:[facility] [address]  Local or Mail Order:local  Ordering Provider:  Would the patient rather a call back or a response via [employer]? Call back  Best Call Back Number:  Additional Information:      .Type:  RX Refill Request    Who Called: pt  Refill or New Rx:Refill  RX Name and Strength:promethazine-dextromethorphan (PROMETHAZINE-DM) 6.25-15 mg/5 mL Syrp  How is the patient currently taking it? (ex. 1XDay):Route: Take 5 mLs by mouth every 6 (six) hours as needed (cough). - Oral  Is this a 30 day or 90 day RX:  Preferred Pharmacy with phone number:[facility] [address]  Local or Mail Order:  Ordering Provider:  Would the patient rather a call back or a response via [employer]? Call back  Best Call Back Number:  Additional Information:

## 2023-06-21 ENCOUNTER — OFFICE VISIT (OUTPATIENT)
Dept: FAMILY MEDICINE | Facility: CLINIC | Age: 68
End: 2023-06-21
Payer: MEDICARE

## 2023-06-21 VITALS
TEMPERATURE: 98 F | RESPIRATION RATE: 19 BRPM | SYSTOLIC BLOOD PRESSURE: 107 MMHG | HEART RATE: 82 BPM | DIASTOLIC BLOOD PRESSURE: 65 MMHG | OXYGEN SATURATION: 99 % | WEIGHT: 128 LBS | BODY MASS INDEX: 19.4 KG/M2 | HEIGHT: 68 IN

## 2023-06-21 DIAGNOSIS — M54.2 CERVICALGIA: ICD-10-CM

## 2023-06-21 DIAGNOSIS — Z12.11 SCREENING FOR COLON CANCER: ICD-10-CM

## 2023-06-21 DIAGNOSIS — M25.512 CHRONIC LEFT SHOULDER PAIN: ICD-10-CM

## 2023-06-21 DIAGNOSIS — J44.9 CHRONIC OBSTRUCTIVE PULMONARY DISEASE, UNSPECIFIED COPD TYPE: ICD-10-CM

## 2023-06-21 DIAGNOSIS — M47.22 CERVICAL RADICULOPATHY DUE TO DEGENERATIVE JOINT DISEASE OF SPINE: Primary | ICD-10-CM

## 2023-06-21 DIAGNOSIS — M47.26 OSTEOARTHRITIS OF SPINE WITH RADICULOPATHY, LUMBAR REGION: ICD-10-CM

## 2023-06-21 DIAGNOSIS — G89.29 CHRONIC LEFT SHOULDER PAIN: ICD-10-CM

## 2023-06-21 DIAGNOSIS — M54.16 LUMBAR RADICULOPATHY, CHRONIC: ICD-10-CM

## 2023-06-21 PROCEDURE — 1125F AMNT PAIN NOTED PAIN PRSNT: CPT | Mod: CPTII,,, | Performed by: FAMILY MEDICINE

## 2023-06-21 PROCEDURE — 1160F RVW MEDS BY RX/DR IN RCRD: CPT | Mod: CPTII,,, | Performed by: FAMILY MEDICINE

## 2023-06-21 PROCEDURE — 1159F PR MEDICATION LIST DOCUMENTED IN MEDICAL RECORD: ICD-10-PCS | Mod: CPTII,,, | Performed by: FAMILY MEDICINE

## 2023-06-21 PROCEDURE — 99214 PR OFFICE/OUTPT VISIT, EST, LEVL IV, 30-39 MIN: ICD-10-PCS | Mod: ,,, | Performed by: FAMILY MEDICINE

## 2023-06-21 PROCEDURE — 3074F SYST BP LT 130 MM HG: CPT | Mod: CPTII,,, | Performed by: FAMILY MEDICINE

## 2023-06-21 PROCEDURE — 3008F BODY MASS INDEX DOCD: CPT | Mod: CPTII,,, | Performed by: FAMILY MEDICINE

## 2023-06-21 PROCEDURE — 1101F PR PT FALLS ASSESS DOC 0-1 FALLS W/OUT INJ PAST YR: ICD-10-PCS | Mod: CPTII,,, | Performed by: FAMILY MEDICINE

## 2023-06-21 PROCEDURE — 3288F PR FALLS RISK ASSESSMENT DOCUMENTED: ICD-10-PCS | Mod: CPTII,,, | Performed by: FAMILY MEDICINE

## 2023-06-21 PROCEDURE — 3078F DIAST BP <80 MM HG: CPT | Mod: CPTII,,, | Performed by: FAMILY MEDICINE

## 2023-06-21 PROCEDURE — 99214 OFFICE O/P EST MOD 30 MIN: CPT | Mod: ,,, | Performed by: FAMILY MEDICINE

## 2023-06-21 PROCEDURE — 1101F PT FALLS ASSESS-DOCD LE1/YR: CPT | Mod: CPTII,,, | Performed by: FAMILY MEDICINE

## 2023-06-21 PROCEDURE — 3074F PR MOST RECENT SYSTOLIC BLOOD PRESSURE < 130 MM HG: ICD-10-PCS | Mod: CPTII,,, | Performed by: FAMILY MEDICINE

## 2023-06-21 PROCEDURE — 1159F MED LIST DOCD IN RCRD: CPT | Mod: CPTII,,, | Performed by: FAMILY MEDICINE

## 2023-06-21 PROCEDURE — 1160F PR REVIEW ALL MEDS BY PRESCRIBER/CLIN PHARMACIST DOCUMENTED: ICD-10-PCS | Mod: CPTII,,, | Performed by: FAMILY MEDICINE

## 2023-06-21 PROCEDURE — 3008F PR BODY MASS INDEX (BMI) DOCUMENTED: ICD-10-PCS | Mod: CPTII,,, | Performed by: FAMILY MEDICINE

## 2023-06-21 PROCEDURE — 1125F PR PAIN SEVERITY QUANTIFIED, PAIN PRESENT: ICD-10-PCS | Mod: CPTII,,, | Performed by: FAMILY MEDICINE

## 2023-06-21 PROCEDURE — 3288F FALL RISK ASSESSMENT DOCD: CPT | Mod: CPTII,,, | Performed by: FAMILY MEDICINE

## 2023-06-21 PROCEDURE — 3078F PR MOST RECENT DIASTOLIC BLOOD PRESSURE < 80 MM HG: ICD-10-PCS | Mod: CPTII,,, | Performed by: FAMILY MEDICINE

## 2023-06-21 RX ORDER — HYDROCODONE BITARTRATE AND ACETAMINOPHEN 7.5; 325 MG/1; MG/1
1 TABLET ORAL EVERY 8 HOURS PRN
Qty: 90 TABLET | Refills: 0 | Status: SHIPPED | OUTPATIENT
Start: 2023-06-21 | End: 2023-07-17 | Stop reason: SDUPTHER

## 2023-06-21 NOTE — PROGRESS NOTES
Patient ID: [id number]     Chief Complaint: Discuss Narco        HPI:     Yogesh Soto is a 68 y.o. male here today with history of chronic neck and left shoulder x > 2 years here for followup neck pain and left shoulder. He reports that he rode horses back in the day and a horse fell on him in the past. Pain is improved with Norco p.r.n. without side effects, but he has been taking Norco 3 times a day with resolution of pain, he needs refill of Rx today, pain level is 9/10 on pain scale in neck and 4-5/10 on pain scale in left shoulder, intermittent, worse with activity, pain is sharp and throbbing, worse with standing and raising his arm as well, he has been referred to Spine Ortho, but cannot get an appt. scheduled due to COVID-19 pandemic, he cancelled pain management appt. due to him feeling good with current regimen and wants to limit his COVID-19 exposure. He is doing well with Flexeril p.r.n., he needs refill of Rx today. He doesn't like the way the Gabapentin makes him feel, no longer takes that Rx. He has severe DJD C-spine and left rotator cuff tear, but he refuses surgery, risks of refusal discussed with patient and patient voices understanding. He has tried PT in the past without resolution of symptoms, but he is open to PT again.  - He also complains of LBP x several years with radiation down left leg, he has a history of DJD L-spine, he hasn't had MRI L-spine in over a year. Pain level is 9/10 on pain scale in back, intermittent, worse with activity, pain is sharp.   - Patient has COPD, complaining of cough with clear sputum production x several months. He denies fever, chills, SOB, wheezing, hemoptysis, chest pain, palpitations. He is not on any inhalers at this time, supposed to be taking Trelegy daily, but only uses it intermittently. He reports promethazine DM works well, he has Rx at home. Hasn't had CXR or PFTs recently. He reports tobacco use 2-3 cigarettes day x 40 years,he is not ready to  quit at this time, risks of refusal discussed with patient voicing understanding, but he is interested in smoking cessation program. He does not meet the criteria for LDCT lung cancer screening.  - He was due for wellness exam with labs in 03/2023, but missed his appointment, needs to reschedule next available.   - He needs Cologuard ordered.   - Patient is without any other complaints today.         ----------------------------  Cervical radiculopathy  Insomnia  Osteopenia  PVD (peripheral vascular disease)     Past Surgical History:   Procedure Laterality Date    CEREBRAL ANGIOGRAM         Review of patient's allergies indicates:  No Known Allergies    Outpatient Medications Marked as Taking for the 6/21/23 encounter (Office Visit) with Rosalinda Montoya MD   Medication Sig Dispense Refill    aspirin (ECOTRIN) 81 MG EC tablet Take 81 mg by mouth once daily.      cholecalciferol, vitamin D3, (VITAMIN D3) 50 mcg (2,000 unit) Tab Take by mouth once daily.      ferrous gluconate (FERGON) 324 MG tablet Take 324 mg by mouth daily with breakfast.      fluticasone-umeclidin-vilanter (TRELEGY ELLIPTA) 100-62.5-25 mcg DsDv Inhale 1 puff into the lungs once daily. 60 each 5    promethazine-dextromethorphan (PROMETHAZINE-DM) 6.25-15 mg/5 mL Syrp Take 5 mLs by mouth every 6 (six) hours as needed (cough). 180 mL 0    [DISCONTINUED] HYDROcodone-acetaminophen (NORCO) 7.5-325 mg per tablet Take 1 tablet by mouth every 12 (twelve) hours as needed for Pain. 30 tablet 0       Social History     Socioeconomic History    Marital status: Single   Tobacco Use    Smoking status: Every Day     Types: Cigarettes    Smokeless tobacco: Never   Substance and Sexual Activity    Alcohol use: Yes     Alcohol/week: 1.0 standard drink     Types: 1 Cans of beer per week     Comment: 1 a week    Drug use: Never    Sexual activity: Not Currently   Social History Narrative    ** Merged History Encounter **          Social Determinants of Health      Financial Resource Strain: High Risk    Difficulty of Paying Living Expenses: Hard   Food Insecurity: No Food Insecurity    Worried About Running Out of Food in the Last Year: Never true    Ran Out of Food in the Last Year: Never true   Transportation Needs: No Transportation Needs    Lack of Transportation (Medical): No    Lack of Transportation (Non-Medical): No   Physical Activity: Sufficiently Active    Days of Exercise per Week: 7 days    Minutes of Exercise per Session: 150+ min   Stress: No Stress Concern Present    Feeling of Stress : Only a little   Social Connections: Socially Isolated    Frequency of Communication with Friends and Family: More than three times a week    Frequency of Social Gatherings with Friends and Family: More than three times a week    Attends Gn[employer] Services: Never    Active Member of Clubs or Organizations: No    Attends Club or Organization Meetings: Never    Marital Status: Never    Housing Stability: Low Risk     Unable to Pay for Housing in the Last Year: No    Number of Places Lived in the Last Year: 1    Unstable Housing in the Last Year: No        Family History   Problem Relation Age of Onset    Hypertension Mother     Hypertension Brother         Subjective:       Review of Systems:    See HPI for details    Constitutional: No fever, No chills, No fatigue.   Eye: No blurring, No visual disturbances.   Ear/Nose/Mouth/Throat: No decreased hearing, No ear pain, No nasal congestion, No sore throat.   Respiratory: No shortness of breath, Cough, No wheezing.   Cardiovascular: No chest pain, No palpitations, No peripheral edema.   Gastrointestinal: No nausea, No vomiting, No diarrhea, No constipation, No abdominal pain.   Genitourinary: No dysuria, No hematuria.   Hematology/Lymphatics: No bruising tendency, No bleeding tendency, No swollen lymph glands.   Endocrine: No excessive thirst, No polyuria, No excessive hunger.   Musculoskeletal: As per HPI; No muscle pain,  "No decreased range of motion.   Integumentary: No rash, No pruritus.   Neurologic: As per HPI; No abnormal balance, No confusion, No headache.   Psychiatric: No anxiety, No depression, Not suicidal, No hallucinations.     All Other ROS: Negative except as stated in HPI.       Objective:     /65 (BP Location: Left arm, Patient Position: Sitting, BP Method: Medium (Automatic))   Pulse 82   Temp 97.7 °F (36.5 °C) (Oral)   Resp 19   Ht 5' 8" (1.727 m)   Wt 58.1 kg (128 lb)   SpO2 99%   BMI 19.46 kg/m²     Physical Exam    General: Alert and oriented, No acute distress.   Eye: Pupils are equal, round and reactive to light, Normal conjunctiva.   HENT: Normocephalic, Tympanic membranes are clear, Normal hearing, Oral mucosa is moist, No pharyngeal erythema.   Throat: Pharynx ( Not edematous, No exudate ).   Neck: Supple, Non-tender, No carotid bruit, No lymphadenopathy, No thyromegaly.   Respiratory: Dry cough, Lungs are clear to auscultation, Respirations are non-labored, Breath sounds are equal, Symmetrical chest wall expansion, No chest wall tenderness.   Cardiovascular: Normal rate, Regular rhythm, No murmur, Good pulses equal in all extremities, No edema.   Gastrointestinal: Soft, Non-tender, Non-distended, Normal bowel sounds, No organomegaly.   Genitourinary: No costovertebral angle tenderness.   Musculoskeletal: Normal range of motion, Normal gait. Bilateral cervical spine with mild posterior TTP, no erythema, no effusion, no deformity, bilateral trapezius muscle spasms. Left shoulder with decreased abduction due to pain, no erythema, no effusion, no deformity, no crepitus. Bilateral lumbar with mild posterior TTP, no effusion, no deformity, bilateral paraspinal muscle spasms, bilateral straight leg raise.  Neurologic: No focal deficits, Cranial Nerves II-XII are grossly intact.   Psychiatric: Cooperative, Appropriate mood & affect, Normal judgment, Non-suicidal.   Mood and affect: Calm.   Behavior: " Relaxed.         Assessment:       ICD-10-CM ICD-9-CM   1. Cervical radiculopathy due to degenerative joint disease of spine  M47.22 721.0   2. Chronic left shoulder pain  M25.512 719.41    G89.29 338.29   3. Osteoarthritis of spine with radiculopathy, lumbar region  M47.26 721.3   4. Chronic obstructive pulmonary disease, unspecified COPD type  J44.9 496   5. Screening for colon cancer  Z12.11 V76.51   6. Cervicalgia  M54.2 723.1   7. Lumbar radiculopathy, chronic  M54.16 724.4        Plan:     Problem List Items Addressed This Visit          Pulmonary    Chronic obstructive pulmonary disease, unspecified COPD type    Relevant Orders    X-Ray Chest PA And Lateral    Complete PFT w/ bronchodilator     Other Visit Diagnoses       Cervical radiculopathy due to degenerative joint disease of spine    -  Primary    Relevant Medications    HYDROcodone-acetaminophen (NORCO) 7.5-325 mg per tablet    Other Relevant Orders    MRI Cervical Spine Without Contrast    Ambulatory referral/consult to Physical/Occupational Therapy    Chronic left shoulder pain        Relevant Medications    HYDROcodone-acetaminophen (NORCO) 7.5-325 mg per tablet    Other Relevant Orders    MRI Shoulder Without Contrast Left    Ambulatory referral/consult to Physical/Occupational Therapy    Osteoarthritis of spine with radiculopathy, lumbar region        Relevant Medications    HYDROcodone-acetaminophen (NORCO) 7.5-325 mg per tablet    Other Relevant Orders    MRI Lumbar Spine Without Contrast    Ambulatory referral/consult to Physical/Occupational Therapy    Screening for colon cancer        Relevant Orders    Cologuard Screening (Multitarget Stool DNA)    Cervicalgia        Relevant Orders    MRI Cervical Spine Without Contrast    Lumbar radiculopathy, chronic        Relevant Orders    MRI Lumbar Spine Without Contrast         1. Cervical radiculopathy due to degenerative joint disease of spine  - Will update MRI Cervical Spine Without Contrast;  Future  - Ambulatory referral/consult to Physical/Occupational Therapy; Future for evaluation and treatment  - HYDROcodone-acetaminophen (NORCO) 7.5-325 mg per tablet; Take 1 tablet by mouth every 8 (eight) hours as needed for Pain.  Dispense: 90 tablet; Refill: 0  - MRI Cervical Spine Without Contrast  - Rx trial of increased dose of Norco to 7.5mg po x q8hrs p.r.n. with close monitoring.  reviewed today. Stretching exercises encouraged. Notify M.D. or ER if symptoms persist or worsen, SI/HI, temp greater than 100.4, or any acute illness.     2. Chronic left shoulder pain  - Will update MRI Shoulder Without Contrast Left; Future  - Ambulatory referral/consult to Physical/Occupational Therapy; Future  - HYDROcodone-acetaminophen (NORCO) 7.5-325 mg per tablet; Take 1 tablet by mouth every 8 (eight) hours as needed for Pain.  Dispense: 90 tablet; Refill: 0  - MRI Shoulder Without Contrast Left  - Same as #1.     3. Osteoarthritis of spine with radiculopathy, lumbar region  - Will update MRI Lumbar Spine Without Contrast; Future  - Ambulatory referral/consult to Physical/Occupational Therapy; Future  - HYDROcodone-acetaminophen (NORCO) 7.5-325 mg per tablet; Take 1 tablet by mouth every 8 (eight) hours as needed for Pain.  Dispense: 90 tablet; Refill: 0  - MRI Lumbar Spine Without Contrast  - Same as #1.     4. Chronic obstructive pulmonary disease, unspecified COPD type  - X-Ray Chest PA And Lateral; Future  - Complete PFT w/ bronchodilator; Future  - CXR, PFTs ordered. Continue Rx Trelegy, needs to take daily. Smoking cessation encouraged. Notify M.D. or ER if symptoms persist or worsen, chest pain, palptiations, SOB, wheezing, hemoptysis, temp greater than 100.4, or any acute illness.    5. Screening for colon cancer  - Cologuard Screening (Multitarget Stool DNA); Future  - Cologuard Screening (Multitarget Stool DNA)    6. Cervicalgia  - MRI Cervical Spine Without Contrast; Future  - MRI Cervical Spine Without  Contrast  - Same as #1.     7. Lumbar radiculopathy, chronic  - MRI Lumbar Spine Without Contrast; Future  - MRI Lumbar Spine Without Contrast  - Same as #1.       Yogesh was seen today for discuss narco.    Diagnoses and all orders for this visit:    Cervical radiculopathy due to degenerative joint disease of spine  -     MRI Cervical Spine Without Contrast; Future  -     Ambulatory referral/consult to Physical/Occupational Therapy; Future  -     HYDROcodone-acetaminophen (NORCO) 7.5-325 mg per tablet; Take 1 tablet by mouth every 8 (eight) hours as needed for Pain.  -     MRI Cervical Spine Without Contrast    Chronic left shoulder pain  -     MRI Shoulder Without Contrast Left; Future  -     Ambulatory referral/consult to Physical/Occupational Therapy; Future  -     HYDROcodone-acetaminophen (NORCO) 7.5-325 mg per tablet; Take 1 tablet by mouth every 8 (eight) hours as needed for Pain.  -     MRI Shoulder Without Contrast Left    Osteoarthritis of spine with radiculopathy, lumbar region  -     MRI Lumbar Spine Without Contrast; Future  -     Ambulatory referral/consult to Physical/Occupational Therapy; Future  -     HYDROcodone-acetaminophen (NORCO) 7.5-325 mg per tablet; Take 1 tablet by mouth every 8 (eight) hours as needed for Pain.  -     MRI Lumbar Spine Without Contrast    Chronic obstructive pulmonary disease, unspecified COPD type  -     X-Ray Chest PA And Lateral; Future  -     Complete PFT w/ bronchodilator; Future  -     X-Ray Chest PA And Lateral  -     Complete PFT w/ bronchodilator    Screening for colon cancer  -     Cologuard Screening (Multitarget Stool DNA); Future  -     Cologuard Screening (Multitarget Stool DNA)    Cervicalgia  -     MRI Cervical Spine Without Contrast; Future  -     MRI Cervical Spine Without Contrast    Lumbar radiculopathy, chronic  -     MRI Lumbar Spine Without Contrast; Future  -     MRI Lumbar Spine Without Contrast          Medication List with Changes/Refills    Current Medications    ASPIRIN (ECOTRIN) 81 MG EC TABLET    Take 81 mg by mouth once daily.       Start Date: --        End Date: --    CHOLECALCIFEROL, VITAMIN D3, (VITAMIN D3) 50 MCG (2,000 UNIT) TAB    Take by mouth once daily.       Start Date: --        End Date: --    CLOPIDOGREL (PLAVIX) 75 MG TABLET    Take 75 mg by mouth once daily.       Start Date: --        End Date: --    FERROUS GLUCONATE (FERGON) 324 MG TABLET    Take 324 mg by mouth daily with breakfast.       Start Date: --        End Date: --    FLUTICASONE-UMECLIDIN-VILANTER (TRELEGY ELLIPTA) 100-62.5-25 MCG DSDV    Inhale 1 puff into the lungs once daily.       Start Date: 1/13/2023 End Date: 1/13/2024    PROMETHAZINE-DEXTROMETHORPHAN (PROMETHAZINE-DM) 6.25-15 MG/5 ML SYRP    Take 5 mLs by mouth every 6 (six) hours as needed (cough).       Start Date: 6/6/2023  End Date: --   Changed and/or Refilled Medications    Modified Medication Previous Medication    HYDROCODONE-ACETAMINOPHEN (NORCO) 7.5-325 MG PER TABLET HYDROcodone-acetaminophen (NORCO) 7.5-325 mg per tablet       Take 1 tablet by mouth every 8 (eight) hours as needed for Pain.    Take 1 tablet by mouth every 12 (twelve) hours as needed for Pain.       Start Date: 6/21/2023 End Date: 7/21/2023    Start Date: 6/6/2023  End Date: 6/21/2023          Follow up in about 3 months (around 9/21/2023) for Wellness.

## 2023-07-17 DIAGNOSIS — J44.9 CHRONIC OBSTRUCTIVE PULMONARY DISEASE, UNSPECIFIED COPD TYPE: ICD-10-CM

## 2023-07-17 DIAGNOSIS — M25.512 CHRONIC LEFT SHOULDER PAIN: ICD-10-CM

## 2023-07-17 DIAGNOSIS — G89.29 CHRONIC LEFT SHOULDER PAIN: ICD-10-CM

## 2023-07-17 DIAGNOSIS — M47.26 OSTEOARTHRITIS OF SPINE WITH RADICULOPATHY, LUMBAR REGION: ICD-10-CM

## 2023-07-17 DIAGNOSIS — M47.22 CERVICAL RADICULOPATHY DUE TO DEGENERATIVE JOINT DISEASE OF SPINE: ICD-10-CM

## 2023-07-17 RX ORDER — PROMETHAZINE HYDROCHLORIDE AND DEXTROMETHORPHAN HYDROBROMIDE 6.25; 15 MG/5ML; MG/5ML
5 SYRUP ORAL EVERY 6 HOURS PRN
Qty: 180 ML | Refills: 0 | Status: SHIPPED | OUTPATIENT
Start: 2023-07-17 | End: 2023-08-17 | Stop reason: SDUPTHER

## 2023-07-17 RX ORDER — HYDROCODONE BITARTRATE AND ACETAMINOPHEN 7.5; 325 MG/1; MG/1
1 TABLET ORAL EVERY 8 HOURS PRN
Qty: 90 TABLET | Refills: 0 | Status: SHIPPED | OUTPATIENT
Start: 2023-07-17 | End: 2023-08-17 | Stop reason: SDUPTHER

## 2023-07-17 NOTE — TELEPHONE ENCOUNTER
----- Message from Yael Skinner sent at 7/17/2023  1:50 PM CDT -----  .Type:  RX Refill Request    Who Called: pt   Refill or New Rx:refill  RX Name and Strength:promethazine-dextromethorphan (PROMETHAZINE-DM) 6.25-15 mg/5 mL Syrp  How is the patient currently taking it? As needed for cough  Is this a 30 day or 90 day RX:  Preferred Pharmacy with phone number:[address]  Local or Mail Order:local  Ordering Provider:Jan  Would the patient rather a call back or a response via [email or web address]? Call back   Best Call Back Number:[phone]  Additional Information: pt is requesting a refill     .Type:  RX Refill Request    Who Called: pt  Refill or New Rx:refill  RX Name and Strength:HYDROcodone-acetaminophen (NORCO) 7.5-325 mg per tablet  How is the patient currently taking it? As needed for pain  Is this a 30 day or 90 day RX:30  Preferred Pharmacy with phone number:[place] [phone] [address]   Local or Mail Order:local   Ordering Provider:Jan  Would the patient rather a call back or a response via [facility]? Call, back  Best Call Back Number:[phone]  Additional Information: pt is requesting a refill

## 2023-08-17 DIAGNOSIS — J44.9 CHRONIC OBSTRUCTIVE PULMONARY DISEASE, UNSPECIFIED COPD TYPE: ICD-10-CM

## 2023-08-17 DIAGNOSIS — M47.22 CERVICAL RADICULOPATHY DUE TO DEGENERATIVE JOINT DISEASE OF SPINE: ICD-10-CM

## 2023-08-17 DIAGNOSIS — G89.29 CHRONIC LEFT SHOULDER PAIN: ICD-10-CM

## 2023-08-17 DIAGNOSIS — M47.26 OSTEOARTHRITIS OF SPINE WITH RADICULOPATHY, LUMBAR REGION: ICD-10-CM

## 2023-08-17 DIAGNOSIS — M25.512 CHRONIC LEFT SHOULDER PAIN: ICD-10-CM

## 2023-08-17 RX ORDER — HYDROCODONE BITARTRATE AND ACETAMINOPHEN 7.5; 325 MG/1; MG/1
1 TABLET ORAL EVERY 8 HOURS PRN
Qty: 90 TABLET | Refills: 0 | Status: SHIPPED | OUTPATIENT
Start: 2023-08-17 | End: 2023-09-14 | Stop reason: SDUPTHER

## 2023-08-17 RX ORDER — PROMETHAZINE HYDROCHLORIDE AND DEXTROMETHORPHAN HYDROBROMIDE 6.25; 15 MG/5ML; MG/5ML
5 SYRUP ORAL EVERY 6 HOURS PRN
Qty: 180 ML | Refills: 0 | Status: SHIPPED | OUTPATIENT
Start: 2023-08-17 | End: 2023-09-14 | Stop reason: SDUPTHER

## 2023-09-14 ENCOUNTER — TELEPHONE (OUTPATIENT)
Dept: FAMILY MEDICINE | Facility: CLINIC | Age: 68
End: 2023-09-14
Payer: MEDICARE

## 2023-09-14 DIAGNOSIS — G89.29 CHRONIC LEFT SHOULDER PAIN: ICD-10-CM

## 2023-09-14 DIAGNOSIS — M25.512 CHRONIC LEFT SHOULDER PAIN: ICD-10-CM

## 2023-09-14 DIAGNOSIS — M47.26 OSTEOARTHRITIS OF SPINE WITH RADICULOPATHY, LUMBAR REGION: ICD-10-CM

## 2023-09-14 DIAGNOSIS — M47.22 CERVICAL RADICULOPATHY DUE TO DEGENERATIVE JOINT DISEASE OF SPINE: ICD-10-CM

## 2023-09-14 DIAGNOSIS — J44.9 CHRONIC OBSTRUCTIVE PULMONARY DISEASE, UNSPECIFIED COPD TYPE: ICD-10-CM

## 2023-09-14 RX ORDER — HYDROCODONE BITARTRATE AND ACETAMINOPHEN 7.5; 325 MG/1; MG/1
1 TABLET ORAL EVERY 8 HOURS PRN
Qty: 90 TABLET | Refills: 0 | Status: SHIPPED | OUTPATIENT
Start: 2023-09-14 | End: 2023-10-13 | Stop reason: SDUPTHER

## 2023-09-14 RX ORDER — PROMETHAZINE HYDROCHLORIDE AND DEXTROMETHORPHAN HYDROBROMIDE 6.25; 15 MG/5ML; MG/5ML
5 SYRUP ORAL EVERY 6 HOURS PRN
Qty: 180 ML | Refills: 0 | Status: SHIPPED | OUTPATIENT
Start: 2023-09-14

## 2023-09-14 NOTE — TELEPHONE ENCOUNTER
----- Message from Sunil Stanton sent at 9/14/2023  9:45 AM CDT -----  .Type:  RX Refill Request    Who Called: pt  Refill or New Rx:Refill  RX Name and Strength:HYDROcodone-acetaminophen (NORCO) 7.5-325 mg per tablet  How is the patient currently taking it? (ex. 1XDay):Take 1 tablet by mouth every 8 (eight) hours as needed for Pain. - Oral  Is this a 30 day or 90 day RX:  Preferred Pharmacy with phone number:[address]  Local or Mail Order:local  Ordering Provider:  Would the patient rather a call back or a response via [facility]? Call back  Best Call Back Number:  Additional Information:      .Type:  RX Refill Request    Who Called: pt  Refill or New Rx:Refill  RX Name and Strength:promethazine-dextromethorphan (PROMETHAZINE-DM) 6.25-15 mg/5 mL Syrp  How is the patient currently taking it? (ex. 1XDay):Take 5 mLs by mouth every 6 (six) hours as needed (cough). - Oral  Is this a 30 day or 90 day RX:  Preferred Pharmacy with phone number:[place] [phone] [facility]  Local or Mail Order:local  Ordering Provider:  Would the patient rather a call back or a response via [facility]? Call back  Best Call Back Number:  Additional Information:

## 2023-10-03 ENCOUNTER — PATIENT OUTREACH (OUTPATIENT)
Dept: ADMINISTRATIVE | Facility: HOSPITAL | Age: 68
End: 2023-10-03
Payer: MEDICARE

## 2023-10-03 NOTE — PROGRESS NOTES
Population Health Outreach.    Cologuard outreach, overdue I called his siter Alisa 2nd contact as well, multiple rings  no answer both contacts unable to leave voicemail.

## 2023-10-13 DIAGNOSIS — M47.26 OSTEOARTHRITIS OF SPINE WITH RADICULOPATHY, LUMBAR REGION: ICD-10-CM

## 2023-10-13 DIAGNOSIS — M25.512 CHRONIC LEFT SHOULDER PAIN: ICD-10-CM

## 2023-10-13 DIAGNOSIS — G89.29 CHRONIC LEFT SHOULDER PAIN: ICD-10-CM

## 2023-10-13 DIAGNOSIS — M47.22 CERVICAL RADICULOPATHY DUE TO DEGENERATIVE JOINT DISEASE OF SPINE: ICD-10-CM

## 2023-10-13 RX ORDER — HYDROCODONE BITARTRATE AND ACETAMINOPHEN 7.5; 325 MG/1; MG/1
1 TABLET ORAL EVERY 8 HOURS PRN
Qty: 10 TABLET | Refills: 0 | Status: SHIPPED | OUTPATIENT
Start: 2023-10-13 | End: 2023-10-16

## 2023-10-13 NOTE — TELEPHONE ENCOUNTER
----- Message from Brianna Cerna sent at 10/13/2023  9:04 AM CDT -----  Regarding: refill  Type:  RX Refill Request    Who Called: Yogesh Guo or New Rx:refill    RX Name and Strength:HYDROcodone-acetaminophen (NORCO) 7.5-325 mg per tablet    How is the patient currently taking it? (ex. 1XDay):    Is this a 30 day or 90 day RX:    Preferred Pharmacy with phone number:[facility] [place]    Local or Mail Order:local    Ordering Provider:    Would the patient rather a call back or a response via [facility]?     Best Call Back Number:    Additional Information:

## 2023-10-17 ENCOUNTER — TELEPHONE (OUTPATIENT)
Dept: FAMILY MEDICINE | Facility: CLINIC | Age: 68
End: 2023-10-17
Payer: MEDICARE

## 2023-10-17 DIAGNOSIS — G89.29 CHRONIC LEFT SHOULDER PAIN: ICD-10-CM

## 2023-10-17 DIAGNOSIS — M47.22 CERVICAL RADICULOPATHY DUE TO DEGENERATIVE JOINT DISEASE OF SPINE: ICD-10-CM

## 2023-10-17 DIAGNOSIS — M25.512 CHRONIC LEFT SHOULDER PAIN: ICD-10-CM

## 2023-10-17 DIAGNOSIS — M47.26 OSTEOARTHRITIS OF SPINE WITH RADICULOPATHY, LUMBAR REGION: ICD-10-CM

## 2023-10-17 RX ORDER — HYDROCODONE BITARTRATE AND ACETAMINOPHEN 7.5; 325 MG/1; MG/1
1 TABLET ORAL EVERY 8 HOURS PRN
Qty: 10 TABLET | Refills: 0 | OUTPATIENT
Start: 2023-10-17 | End: 2023-10-20

## 2023-10-17 NOTE — TELEPHONE ENCOUNTER
----- Message from Breanne Vallejo sent at 10/17/2023  9:44 AM CDT -----  Regarding: med refill  .Type:  RX Refill Request    Who Called: pt  Refill or New Rx:refill  RX Name and Strength:HYDROcodone-acetaminophen (NORCO) 7.5-325 mg per tablet  How is the patient currently taking it? (ex. 1XDay):  Is this a 30 day or 90 day RX:10  Preferred Pharmacy with phone number:[facility]   Phone: [phone]  Fax: [phone]  Local or Mail Order:local  Ordering Provider:Jan  Would the patient rather a call back or a response via [facility]? Call back   Best Call Back Number:[phone]  Additional Information: pt needs authorization from  to refill prescription

## 2023-10-17 NOTE — TELEPHONE ENCOUNTER
Pt voiced he will find another doctor. He was very rude even after I told him we had him another appointment for him scheduled on the 30th. Appointments cancelled.

## 2023-10-17 NOTE — TELEPHONE ENCOUNTER
----- Message from Sunil Stanton sent at 10/17/2023 10:13 AM CDT -----  .Type:  Patient Returning Call    Who Called:pt  Who Left Message for Patient:  Does the patient know what this is regarding?:medication  Would the patient rather a call back or a response via [facility]? Call back  Best Call Back Number:[phone]  Additional Information:

## 2023-11-22 ENCOUNTER — TELEPHONE (OUTPATIENT)
Dept: FAMILY MEDICINE | Facility: CLINIC | Age: 68
End: 2023-11-22
Payer: MEDICARE

## 2023-11-22 DIAGNOSIS — M47.22 CERVICAL RADICULOPATHY DUE TO DEGENERATIVE JOINT DISEASE OF SPINE: Primary | ICD-10-CM

## 2023-11-22 DIAGNOSIS — G89.29 CHRONIC LEFT SHOULDER PAIN: ICD-10-CM

## 2023-11-22 DIAGNOSIS — M47.26 OSTEOARTHRITIS OF SPINE WITH RADICULOPATHY, LUMBAR REGION: ICD-10-CM

## 2023-11-22 DIAGNOSIS — M25.512 CHRONIC LEFT SHOULDER PAIN: ICD-10-CM

## 2023-11-22 NOTE — TELEPHONE ENCOUNTER
----- Message from Brianna Cerna sent at 11/22/2023  1:10 PM CST -----  Regarding: Refill  Type:  RX Refill Request    Who Called: Yogesh Guo or New Rx:refill    RX Name and Strength:     HYDROcodone-acetaminophen (NORCO) 7.5-325 mg per tablet         How is the patient currently taking it? (ex. 1XDay):    Is this a 30 day or 90 day RX:    Preferred Pharmacy with phone number:[facility]    Local or Mail Order:    Ordering Provider:    Would the patient rather a call back or a response via [facility]? Call back    Best Call Back Number:    Additional Information: Patient has clari with NP

## 2023-11-27 RX ORDER — HYDROCODONE BITARTRATE AND ACETAMINOPHEN 7.5; 325 MG/1; MG/1
1 TABLET ORAL EVERY 8 HOURS PRN
Qty: 9 TABLET | Refills: 0 | Status: SHIPPED | OUTPATIENT
Start: 2023-11-27 | End: 2023-11-30 | Stop reason: SDUPTHER

## 2023-11-29 ENCOUNTER — DOCUMENTATION ONLY (OUTPATIENT)
Dept: FAMILY MEDICINE | Facility: CLINIC | Age: 68
End: 2023-11-29
Payer: MEDICARE

## 2023-11-30 ENCOUNTER — OFFICE VISIT (OUTPATIENT)
Dept: FAMILY MEDICINE | Facility: CLINIC | Age: 68
End: 2023-11-30
Payer: MEDICARE

## 2023-11-30 VITALS
TEMPERATURE: 98 F | RESPIRATION RATE: 19 BRPM | WEIGHT: 123.63 LBS | OXYGEN SATURATION: 99 % | SYSTOLIC BLOOD PRESSURE: 138 MMHG | HEIGHT: 68 IN | DIASTOLIC BLOOD PRESSURE: 73 MMHG | HEART RATE: 94 BPM | BODY MASS INDEX: 18.74 KG/M2

## 2023-11-30 DIAGNOSIS — G89.29 CHRONIC NECK PAIN: Primary | ICD-10-CM

## 2023-11-30 DIAGNOSIS — G89.29 CHRONIC LEFT SHOULDER PAIN: ICD-10-CM

## 2023-11-30 DIAGNOSIS — M25.512 CHRONIC PAIN OF BOTH SHOULDERS: ICD-10-CM

## 2023-11-30 DIAGNOSIS — M47.26 OSTEOARTHRITIS OF SPINE WITH RADICULOPATHY, LUMBAR REGION: ICD-10-CM

## 2023-11-30 DIAGNOSIS — M25.511 CHRONIC PAIN OF BOTH SHOULDERS: ICD-10-CM

## 2023-11-30 DIAGNOSIS — M54.2 CHRONIC NECK PAIN: Primary | ICD-10-CM

## 2023-11-30 DIAGNOSIS — M25.512 CHRONIC LEFT SHOULDER PAIN: ICD-10-CM

## 2023-11-30 DIAGNOSIS — M47.22 CERVICAL RADICULOPATHY DUE TO DEGENERATIVE JOINT DISEASE OF SPINE: ICD-10-CM

## 2023-11-30 DIAGNOSIS — G89.29 CHRONIC PAIN OF BOTH SHOULDERS: ICD-10-CM

## 2023-11-30 PROCEDURE — 1159F PR MEDICATION LIST DOCUMENTED IN MEDICAL RECORD: ICD-10-PCS | Mod: CPTII,,, | Performed by: NURSE PRACTITIONER

## 2023-11-30 PROCEDURE — 1159F MED LIST DOCD IN RCRD: CPT | Mod: CPTII,,, | Performed by: NURSE PRACTITIONER

## 2023-11-30 PROCEDURE — 1126F AMNT PAIN NOTED NONE PRSNT: CPT | Mod: CPTII,,, | Performed by: NURSE PRACTITIONER

## 2023-11-30 PROCEDURE — 3075F PR MOST RECENT SYSTOLIC BLOOD PRESS GE 130-139MM HG: ICD-10-PCS | Mod: CPTII,,, | Performed by: NURSE PRACTITIONER

## 2023-11-30 PROCEDURE — 3075F SYST BP GE 130 - 139MM HG: CPT | Mod: CPTII,,, | Performed by: NURSE PRACTITIONER

## 2023-11-30 PROCEDURE — 1160F RVW MEDS BY RX/DR IN RCRD: CPT | Mod: CPTII,,, | Performed by: NURSE PRACTITIONER

## 2023-11-30 PROCEDURE — 99213 OFFICE O/P EST LOW 20 MIN: CPT | Mod: ,,, | Performed by: NURSE PRACTITIONER

## 2023-11-30 PROCEDURE — 99213 PR OFFICE/OUTPT VISIT, EST, LEVL III, 20-29 MIN: ICD-10-PCS | Mod: ,,, | Performed by: NURSE PRACTITIONER

## 2023-11-30 PROCEDURE — 3078F DIAST BP <80 MM HG: CPT | Mod: CPTII,,, | Performed by: NURSE PRACTITIONER

## 2023-11-30 PROCEDURE — 3008F PR BODY MASS INDEX (BMI) DOCUMENTED: ICD-10-PCS | Mod: CPTII,,, | Performed by: NURSE PRACTITIONER

## 2023-11-30 PROCEDURE — 3078F PR MOST RECENT DIASTOLIC BLOOD PRESSURE < 80 MM HG: ICD-10-PCS | Mod: CPTII,,, | Performed by: NURSE PRACTITIONER

## 2023-11-30 PROCEDURE — 1126F PR PAIN SEVERITY QUANTIFIED, NO PAIN PRESENT: ICD-10-PCS | Mod: CPTII,,, | Performed by: NURSE PRACTITIONER

## 2023-11-30 PROCEDURE — 1160F PR REVIEW ALL MEDS BY PRESCRIBER/CLIN PHARMACIST DOCUMENTED: ICD-10-PCS | Mod: CPTII,,, | Performed by: NURSE PRACTITIONER

## 2023-11-30 PROCEDURE — 3008F BODY MASS INDEX DOCD: CPT | Mod: CPTII,,, | Performed by: NURSE PRACTITIONER

## 2023-11-30 RX ORDER — HYDROCODONE BITARTRATE AND ACETAMINOPHEN 7.5; 325 MG/1; MG/1
1 TABLET ORAL EVERY 8 HOURS PRN
Qty: 90 TABLET | Refills: 0 | Status: SHIPPED | OUTPATIENT
Start: 2023-11-30 | End: 2023-12-30

## 2023-11-30 NOTE — PROGRESS NOTES
Subjective:      Patient ID: Yogesh Soto is a 68 y.o. Black or  male      Chief Complaint: Medication Refill (Hydrocodone)       Past Medical History:   Diagnosis Date    Cervical radiculopathy     Insomnia     Osteopenia     PVD (peripheral vascular disease)         HPI  Presents to clinic for medication refills.    Chronic neck/shoulder pain: C/O of chronic neck/shoulder pain for > 2 years.  Hx of horse riding, in which horse fell on him in the past. Pain is improved with Norco p.r.n. without side effects.  Pain is 9/10 of both neck and shoulders.  Of note, previously referred pain management appt but pt feels good with current regimen and wants to limit his COVID-19 exposure. He is doing well and he needs refill of Norco today.  Denies any other problems.              Patient Care Team:  Rosalinda Montoya MD as PCP - General (Family Medicine)  Jasmin Fishman NP (Nurse Practitioner)  Mariaelena Saleh NP as Nurse Practitioner (Nurse Practitioner)      Review of Systems   Constitutional:  Negative for chills, fatigue, fever and unexpected weight change.   HENT: Negative.     Eyes: Negative.    Respiratory: Negative.  Negative for shortness of breath.    Cardiovascular: Negative.  Negative for chest pain.   Gastrointestinal: Negative.    Endocrine: Negative.    Genitourinary: Negative.    Musculoskeletal:  Positive for arthralgias and back pain.        Neck pain and Shoulder pain   Integumentary:  Negative.   Allergic/Immunologic: Negative.    Neurological: Negative.  Negative for weakness.   Hematological: Negative.    Psychiatric/Behavioral: Negative.     All other systems reviewed and are negative.          Objective:      Vitals:    11/30/23 1508   BP: 138/73   Pulse: 94   Resp: 19   Temp: 98.1 °F (36.7 °C)      Body mass index is 18.79 kg/m².     Physical Exam  Vitals reviewed.   Constitutional:       Appearance: He is not toxic-appearing.   HENT:      Head: Normocephalic.       Mouth/Throat:      Mouth: Mucous membranes are moist.   Eyes:      Extraocular Movements: Extraocular movements intact.      Pupils: Pupils are equal, round, and reactive to light.   Cardiovascular:      Rate and Rhythm: Normal rate and regular rhythm.      Pulses: Normal pulses.      Heart sounds: Normal heart sounds.   Pulmonary:      Effort: Pulmonary effort is normal. No respiratory distress.      Breath sounds: Normal breath sounds.   Abdominal:      General: Bowel sounds are normal. There is no distension.      Palpations: Abdomen is soft.      Tenderness: There is no abdominal tenderness.   Musculoskeletal:         General: No tenderness. Normal range of motion.      Cervical back: Neck supple.   Skin:     General: Skin is warm and dry.   Neurological:      Mental Status: He is alert and oriented to person, place, and time.   Psychiatric:         Mood and Affect: Mood normal.            Current Outpatient Medications:     aspirin (ECOTRIN) 81 MG EC tablet, Take 81 mg by mouth once daily., Disp: , Rfl:     cholecalciferol, vitamin D3, (VITAMIN D3) 50 mcg (2,000 unit) Tab, Take by mouth once daily., Disp: , Rfl:     clopidogreL (PLAVIX) 75 mg tablet, Take 75 mg by mouth once daily., Disp: , Rfl:     ferrous gluconate (FERGON) 324 MG tablet, Take 324 mg by mouth daily with breakfast., Disp: , Rfl:     fluticasone-umeclidin-vilanter (TRELEGY ELLIPTA) 100-62.5-25 mcg DsDv, Inhale 1 puff into the lungs once daily., Disp: 60 each, Rfl: 5    HYDROcodone-acetaminophen (NORCO) 7.5-325 mg per tablet, Take 1 tablet by mouth every 8 (eight) hours as needed for Pain., Disp: 9 tablet, Rfl: 0    promethazine-dextromethorphan (PROMETHAZINE-DM) 6.25-15 mg/5 mL Syrp, Take 5 mLs by mouth every 6 (six) hours as needed (cough)., Disp: 180 mL, Rfl: 0    Assessment & Plan:     Problem List Items Addressed This Visit          Orthopedic    Chronic neck pain - Primary     Chronic pain for many years   Needs refills of Norco  Informed  that Nps do not treat chronic pain; will reach out to PCP for refills  Appt with PCP made while in office for 1/4/2024  Verbalizes understanding           Chronic pain of both shoulders     Chronic pain for many years   Needs refills of [place]  Informed that Nps do not treat chronic pain; will reach out to PCP for refills  Appt with PCP made while in office for 1/4/2024  Verbalizes understanding                 Prior to the patient's arrival on the same day, I spent (12) minutes reviewing chart. Once in the exam room with the patient, I spent (6 ) minutes in the room with the member performing a history and exam as well as reviewing the test results and recommendations with the patient. After leaving the exam room, I spent an additional (2 ) minutes completing the electronic health record. The total time spent that day caring for the member is (20 ) minutes, and this time - including the breakdown - is documented in the medical record.

## 2023-11-30 NOTE — ASSESSMENT & PLAN NOTE
Chronic pain for many years   Needs refills of [place]  Informed that Nps do not treat chronic pain; will reach out to PCP for refills  Appt with PCP made while in office for 1/4/2024  Verbalizes understanding

## 2023-11-30 NOTE — ASSESSMENT & PLAN NOTE
Chronic pain for many years   Needs refills of [place]  Informed that Nps do not treat chronic pain; will reach out to PCP for refills  Appt with PCP made while in office for 1/4/2024  Verbalizes understanding

## 2024-01-10 ENCOUNTER — OFFICE VISIT (OUTPATIENT)
Dept: FAMILY MEDICINE | Facility: CLINIC | Age: 69
End: 2024-01-10
Payer: MEDICARE

## 2024-01-10 VITALS
TEMPERATURE: 97 F | SYSTOLIC BLOOD PRESSURE: 137 MMHG | OXYGEN SATURATION: 99 % | WEIGHT: 128.38 LBS | RESPIRATION RATE: 19 BRPM | DIASTOLIC BLOOD PRESSURE: 78 MMHG | HEART RATE: 81 BPM | BODY MASS INDEX: 19.46 KG/M2 | HEIGHT: 68 IN

## 2024-01-10 DIAGNOSIS — M54.2 CHRONIC NECK PAIN: Primary | ICD-10-CM

## 2024-01-10 DIAGNOSIS — G89.29 CHRONIC NECK PAIN: Primary | ICD-10-CM

## 2024-01-10 PROCEDURE — 3075F SYST BP GE 130 - 139MM HG: CPT | Mod: CPTII,,, | Performed by: NURSE PRACTITIONER

## 2024-01-10 PROCEDURE — 1101F PT FALLS ASSESS-DOCD LE1/YR: CPT | Mod: CPTII,,, | Performed by: NURSE PRACTITIONER

## 2024-01-10 PROCEDURE — 3008F BODY MASS INDEX DOCD: CPT | Mod: CPTII,,, | Performed by: NURSE PRACTITIONER

## 2024-01-10 PROCEDURE — 1126F AMNT PAIN NOTED NONE PRSNT: CPT | Mod: CPTII,,, | Performed by: NURSE PRACTITIONER

## 2024-01-10 PROCEDURE — 1160F RVW MEDS BY RX/DR IN RCRD: CPT | Mod: CPTII,,, | Performed by: NURSE PRACTITIONER

## 2024-01-10 PROCEDURE — 3078F DIAST BP <80 MM HG: CPT | Mod: CPTII,,, | Performed by: NURSE PRACTITIONER

## 2024-01-10 PROCEDURE — 1159F MED LIST DOCD IN RCRD: CPT | Mod: CPTII,,, | Performed by: NURSE PRACTITIONER

## 2024-01-10 PROCEDURE — 3288F FALL RISK ASSESSMENT DOCD: CPT | Mod: CPTII,,, | Performed by: NURSE PRACTITIONER

## 2024-01-10 PROCEDURE — 99213 OFFICE O/P EST LOW 20 MIN: CPT | Mod: ,,, | Performed by: NURSE PRACTITIONER

## 2024-01-10 RX ORDER — HYDROCODONE BITARTRATE AND ACETAMINOPHEN 7.5; 325 MG/1; MG/1
1 TABLET ORAL EVERY 8 HOURS PRN
COMMUNITY
End: 2024-01-10 | Stop reason: SDUPTHER

## 2024-01-10 RX ORDER — IPRATROPIUM BROMIDE AND ALBUTEROL 20; 100 UG/1; UG/1
SPRAY, METERED RESPIRATORY (INHALATION)
COMMUNITY
Start: 2023-11-27

## 2024-01-10 RX ORDER — HYDROCODONE BITARTRATE AND ACETAMINOPHEN 7.5; 325 MG/1; MG/1
1 TABLET ORAL EVERY 8 HOURS PRN
Qty: 90 TABLET | Refills: 0 | Status: SHIPPED | OUTPATIENT
Start: 2024-01-10 | End: 2024-02-07 | Stop reason: SDUPTHER

## 2024-01-10 RX ORDER — IBUPROFEN 600 MG/1
600 TABLET ORAL EVERY 8 HOURS PRN
COMMUNITY
Start: 2023-11-27

## 2024-01-10 RX ORDER — HYDROCODONE BITARTRATE AND ACETAMINOPHEN 7.5; 325 MG/1; MG/1
1 TABLET ORAL EVERY 6 HOURS PRN
COMMUNITY
End: 2024-01-10 | Stop reason: SDUPTHER

## 2024-01-10 NOTE — PROGRESS NOTES
Subjective:      Patient ID: Yogesh Soto is a 68 y.o. Black or  male      Chief Complaint: Medication Refill (Norco)       Past Medical History:   Diagnosis Date    Cervical radiculopathy     Insomnia     Osteopenia     PVD (peripheral vascular disease)         HPI  Presents to clinic for medication refills.     Chronic neck/shoulder pain: C/O of chronic neck/shoulder pain for > 2 years.  Hx of horse riding, in which horse fell on him in the past. Pain is improved with Norco p.r.n. without side effects.  Pain is 9/10 of both neck and shoulders.  Of note, previously referred pain management appt but pt feels good with current regimen and wants to limit his COVID-19 exposure. He is doing well and he needs refill of Norco today.  Denies any other problems.          Patient Care Team:  Rosalinda Montoya MD as PCP - General (Family Medicine)  Jasmin Fishman NP (Nurse Practitioner)  Mariaelena Saleh NP as Nurse Practitioner (Nurse Practitioner)      Review of Systems   Constitutional:  Negative for chills, fatigue, fever and unexpected weight change.   HENT: Negative.     Eyes: Negative.    Respiratory: Negative.  Negative for shortness of breath.    Cardiovascular: Negative.  Negative for chest pain.   Gastrointestinal: Negative.    Endocrine: Negative.    Genitourinary: Negative.    Musculoskeletal: Negative.    Integumentary:  Negative.   Allergic/Immunologic: Negative.    Neurological: Negative.  Negative for weakness.   Hematological: Negative.    Psychiatric/Behavioral: Negative.     All other systems reviewed and are negative.          Objective:      There were no vitals filed for this visit.   There is no height or weight on file to calculate BMI.     Physical Exam  Vitals reviewed.   Constitutional:       Appearance: He is not toxic-appearing.   HENT:      Head: Normocephalic.      Mouth/Throat:      Mouth: Mucous membranes are moist.   Eyes:      Extraocular Movements:  Extraocular movements intact.      Pupils: Pupils are equal, round, and reactive to light.   Cardiovascular:      Rate and Rhythm: Normal rate and regular rhythm.      Pulses: Normal pulses.      Heart sounds: Normal heart sounds.   Pulmonary:      Effort: Pulmonary effort is normal. No respiratory distress.      Breath sounds: Normal breath sounds.   Abdominal:      General: Bowel sounds are normal. There is no distension.      Palpations: Abdomen is soft.      Tenderness: There is no abdominal tenderness.   Musculoskeletal:         General: No tenderness. Normal range of motion.      Cervical back: Neck supple.   Skin:     General: Skin is warm and dry.   Neurological:      Mental Status: He is alert and oriented to person, place, and time.   Psychiatric:         Mood and Affect: Mood normal.            Current Outpatient Medications:     aspirin (ECOTRIN) 81 MG EC tablet, Take 81 mg by mouth once daily., Disp: , Rfl:     cholecalciferol, vitamin D3, (VITAMIN D3) 50 mcg (2,000 unit) Tab, Take by mouth once daily., Disp: , Rfl:     ferrous gluconate (FERGON) 324 MG tablet, Take 324 mg by mouth daily with breakfast., Disp: , Rfl:     fluticasone-umeclidin-vilanter (TRELEGY ELLIPTA) 100-62.5-25 mcg DsDv, Inhale 1 puff into the lungs once daily., Disp: 60 each, Rfl: 5     mg tablet, Take 600 mg by mouth every 8 (eight) hours as needed., Disp: , Rfl:     clopidogreL (PLAVIX) 75 mg tablet, Take 75 mg by mouth once daily., Disp: , Rfl:     COMBIVENT RESPIMAT  mcg/actuation inhaler, SMARTSI Spray(s) By Mouth 3 Times Daily, Disp: , Rfl:     promethazine-dextromethorphan (PROMETHAZINE-DM) 6.25-15 mg/5 mL Syrp, Take 5 mLs by mouth every 6 (six) hours as needed (cough). (Patient not taking: Reported on 1/10/2024), Disp: 180 mL, Rfl: 0    Assessment & Plan:     Problem List Items Addressed This Visit          Orthopedic    Chronic neck pain - Primary     Chronic pain for many years   Needs refills of  Norco  Informed that Nps do not treat chronic pain; will reach out to PCP for refills  Appt with PCP made while in office for 1/4/2024  Verbalizes understanding                 Prior to the patient's arrival on the same day, I spent (5) minutes reviewing chart. Once in the exam room with the patient, I spent (11 ) minutes in the room with the member performing a history and exam as well as reviewing the test results and recommendations with the patient. After leaving the exam room, I spent an additional (4 ) minutes completing the electronic health record. The total time spent that day caring for the member is (20 ) minutes, and this time - including the breakdown - is documented in the medical record.

## 2024-01-10 NOTE — ASSESSMENT & PLAN NOTE
Chronic pain for many years   Needs refills of [place]  Informed that Nps do not treat chronic pain; will reach out to PCP for refills  Appt with PCP made while in office for 1/4/2024; Informed that he must keep appt.    Verbalizes understanding

## 2024-02-07 RX ORDER — HYDROCODONE BITARTRATE AND ACETAMINOPHEN 7.5; 325 MG/1; MG/1
1 TABLET ORAL EVERY 8 HOURS PRN
Qty: 90 TABLET | Refills: 0 | Status: SHIPPED | OUTPATIENT
Start: 2024-02-07 | End: 2024-02-27

## 2024-02-27 ENCOUNTER — OFFICE VISIT (OUTPATIENT)
Dept: FAMILY MEDICINE | Facility: CLINIC | Age: 69
End: 2024-02-27
Payer: MEDICARE

## 2024-02-27 VITALS
SYSTOLIC BLOOD PRESSURE: 117 MMHG | BODY MASS INDEX: 19.13 KG/M2 | WEIGHT: 125.81 LBS | DIASTOLIC BLOOD PRESSURE: 78 MMHG

## 2024-02-27 DIAGNOSIS — R79.9 ABNORMAL FINDING OF BLOOD CHEMISTRY, UNSPECIFIED: ICD-10-CM

## 2024-02-27 DIAGNOSIS — M54.16 LUMBAR RADICULOPATHY, CHRONIC: ICD-10-CM

## 2024-02-27 DIAGNOSIS — Z12.5 SCREENING PSA (PROSTATE SPECIFIC ANTIGEN): ICD-10-CM

## 2024-02-27 DIAGNOSIS — F11.20 OPIOID DEPENDENCE, UNCOMPLICATED: ICD-10-CM

## 2024-02-27 DIAGNOSIS — M47.22 CERVICAL RADICULOPATHY DUE TO DEGENERATIVE JOINT DISEASE OF SPINE: Primary | ICD-10-CM

## 2024-02-27 DIAGNOSIS — M25.551 RIGHT HIP PAIN: ICD-10-CM

## 2024-02-27 DIAGNOSIS — Z00.00 WELLNESS EXAMINATION: Primary | ICD-10-CM

## 2024-02-27 DIAGNOSIS — J44.9 CHRONIC OBSTRUCTIVE PULMONARY DISEASE, UNSPECIFIED COPD TYPE: ICD-10-CM

## 2024-02-27 DIAGNOSIS — I73.9 PERIPHERAL VASCULAR DISEASE, UNSPECIFIED: ICD-10-CM

## 2024-02-27 PROCEDURE — 1159F MED LIST DOCD IN RCRD: CPT | Mod: CPTII,95,, | Performed by: FAMILY MEDICINE

## 2024-02-27 PROCEDURE — 3288F FALL RISK ASSESSMENT DOCD: CPT | Mod: CPTII,95,, | Performed by: FAMILY MEDICINE

## 2024-02-27 PROCEDURE — 1160F RVW MEDS BY RX/DR IN RCRD: CPT | Mod: CPTII,95,, | Performed by: FAMILY MEDICINE

## 2024-02-27 PROCEDURE — 3008F BODY MASS INDEX DOCD: CPT | Mod: CPTII,95,, | Performed by: FAMILY MEDICINE

## 2024-02-27 PROCEDURE — 3074F SYST BP LT 130 MM HG: CPT | Mod: CPTII,95,, | Performed by: FAMILY MEDICINE

## 2024-02-27 PROCEDURE — 1101F PT FALLS ASSESS-DOCD LE1/YR: CPT | Mod: CPTII,95,, | Performed by: FAMILY MEDICINE

## 2024-02-27 PROCEDURE — 3078F DIAST BP <80 MM HG: CPT | Mod: CPTII,95,, | Performed by: FAMILY MEDICINE

## 2024-02-27 PROCEDURE — 99214 OFFICE O/P EST MOD 30 MIN: CPT | Mod: 95,,, | Performed by: FAMILY MEDICINE

## 2024-02-27 RX ORDER — HYDROCODONE BITARTRATE AND ACETAMINOPHEN 10; 325 MG/1; MG/1
1 TABLET ORAL EVERY 8 HOURS PRN
Qty: 90 TABLET | Refills: 0 | Status: SHIPPED | OUTPATIENT
Start: 2024-02-27 | End: 2024-03-25 | Stop reason: SDUPTHER

## 2024-02-27 NOTE — PROGRESS NOTES
Patient ID: [id number]     Chief Complaint: Follow-up (medication)        HPI:     This is a telemedicine note. Patient was treated using telemedicine, real time audio and video, according to [facility] protocols. IRosalinda M.D. , conducted the visit from the [facility]. The patient participated in the visit at a non-[facility] location selected by the patient, identified below. I am licensed in the state where the patient stated they are located. The patient stated that they understood and accepted the privacy and security risks to their information at their location. This visit is not recorded.    Patient was located at the patient's home.     Yogesh Soto is a 68 y.o. male here today for a telemedicine visit here today with history of chronic neck and left shoulder x > 2 years here for followup neck pain and left shoulder. He reports that he rode horses back in the day and a horse fell on him in the past. Pain is improved with Norco p.r.n. without side effects, but he has been taking Norco 3-4 times a day with resolution of pain, would like higher dose, he needs refill of Rx today, pain level is 9/10 on pain scale in neck and 4-5/10 on pain scale in left shoulder, intermittent, worse with activity, pain is sharp and throbbing, worse with standing and raising his arm as well, he has been referred to Spine Ortho, but cannot get an appt. scheduled due to COVID-19 pandemic, he cancelled pain management appt. due to him feeling good with current regimen and wants to limit his COVID-19 exposure. He is doing well with Flexeril p.r.n.. He doesn't like the way the Gabapentin makes him feel, no longer takes that Rx. He has severe DJD C-spine and left rotator cuff tear, but he refuses surgery, risks of refusal discussed with patient and patient voices understanding. He has tried PT in the past without resolution of symptoms.  - He also complains of LBP x several years with radiation down left leg, he has  a history of DJD L-spine, he hasn't had MRI L-spine in over a year. Pain level is 9/10 on pain scale in back, intermittent, worse with activity, pain is sharp. Had a recent fall, landing on his right hip. He hasn't had an imaging.   - Patient has COPD, complaining of cough with clear sputum production x several months. He denies fever, chills, SOB, wheezing, hemoptysis, chest pain, palpitations. He is not on any inhalers at this time, supposed to be taking Trelegy daily, but only uses it intermittently. He reports promethazine DM works well, he has Rx at home. Hasn't had CXR or PFTs recently. He reports tobacco use 2-3 cigarettes day x 40 years,he is not ready to quit at this time, risks of refusal discussed with patient voicing understanding, but he is interested in smoking cessation program. He does not meet the criteria for LDCT lung cancer screening.  - PVD is stable and symptomatic, followed by Vascular p.r.n.   - Patient is without any other complaints today.           ----------------------------  Cervical radiculopathy  Insomnia  Osteopenia  PVD (peripheral vascular disease)     Past Surgical History:   Procedure Laterality Date    CEREBRAL ANGIOGRAM         Review of patient's allergies indicates:  No Known Allergies    Outpatient Medications Marked as Taking for the 24 encounter (Office Visit) with Rosalinda Montoya MD   Medication Sig Dispense Refill    aspirin (ECOTRIN) 81 MG EC tablet Take 81 mg by mouth once daily.      cholecalciferol, vitamin D3, (VITAMIN D3) 50 mcg (2,000 unit) Tab Take by mouth once daily.      clopidogreL (PLAVIX) 75 mg tablet Take 75 mg by mouth once daily.      COMBIVENT RESPIMAT  mcg/actuation inhaler SMARTSI Spray(s) By Mouth 3 Times Daily      ferrous gluconate (FERGON) 324 MG tablet Take 324 mg by mouth daily with breakfast.       mg tablet Take 600 mg by mouth every 8 (eight) hours as needed.      promethazine-dextromethorphan (PROMETHAZINE-DM)  6.25-15 mg/5 mL Syrp Take 5 mLs by mouth every 6 (six) hours as needed (cough). 180 mL 0    [DISCONTINUED] HYDROcodone-acetaminophen (NORCO) 7.5-325 mg per tablet Take 1 tablet by mouth every 8 (eight) hours as needed for Pain. 90 tablet 0       Social History     Socioeconomic History    Marital status: Single   Tobacco Use    Smoking status: Some Days     Types: Cigarettes    Smokeless tobacco: Never    Tobacco comments:     3 cigarettes a week   Substance and Sexual Activity    Alcohol use: Yes     Alcohol/week: 1.0 standard drink of alcohol     Types: 1 Cans of beer per week     Comment: 1 a week    Drug use: Never    Sexual activity: Not Currently   Social History Narrative    ** Merged History Encounter **          Social Determinants of Health     Financial Resource Strain: Low Risk  (2/27/2024)    Overall Financial Resource Strain (CARDIA)     Difficulty of Paying Living Expenses: Not hard at all   Food Insecurity: No Food Insecurity (2/27/2024)    Hunger Vital Sign     Worried About Running Out of Food in the Last Year: Never true     Ran Out of Food in the Last Year: Never true   Transportation Needs: No Transportation Needs (2/27/2024)    PRAPARE - Transportation     Lack of Transportation (Medical): No     Lack of Transportation (Non-Medical): No   Physical Activity: Inactive (2/27/2024)    Exercise Vital Sign     Days of Exercise per Week: 0 days     Minutes of Exercise per Session: 10 min   Stress: No Stress Concern Present (2/27/2024)    [employer] [employer] [employer] - Occupational Stress Questionnaire     Feeling of Stress : Not at all   Social Connections: Unknown (2/27/2024)    Social Connection and Isolation Panel [NHANES]     Frequency of Communication with Friends and Family: Never     Frequency of Social Gatherings with Friends and Family: Never     Attends Spiritism Services: Never     Active Member of Clubs or Organizations: No     Attends Club or Organization Meetings: Never      Marital Status: Patient declined   Housing Stability: Low Risk  (2/27/2024)    Housing Stability Vital Sign     Unable to Pay for Housing in the Last Year: No     Number of Places Lived in the Last Year: 1     Unstable Housing in the Last Year: No        Family History   Problem Relation Age of Onset    Hypertension Mother     Hypertension Brother         Subjective:       See HPI for details    Constitutional: Denies Change in appetite. Denies Chills. Denies Fever. Denies Night sweats.  Eye: Denies Blurred vision. Denies Discharge. Denies Eye pain.  ENT: Denies Decreased hearing. Denies Sore throat. Denies Swollen glands.  Respiratory: Denies Cough. Denies Shortness of breath. Denies Shortness of breath with exertion. Denies Wheezing.  Cardiovascular: Denies Chest pain at rest. Denies Chest pain with exertion. Denies Irregular heartbeat. Denies Palpitations.  Gastrointestinal: Denies Abdominal pain. Denies Diarrhea. Denies Nausea. Denies Vomiting. Denies Hematemesis or Hematochezia.  Genitourinary: Denies Dysuria. Denies Urinary frequency. Denies Urinary urgency. Denies Blood in urine.  Endocrine: Denies Cold intolerance. Denies Excessive thirst. Denies Heat intolerance. Denies Weight loss. Denies Weight gain.  Musculoskeletal: Reports Painful joints. Denies Weakness.  Integumentary: Denies Rash. Denies Itching. Denies Dry skin.  Neurologic: Denies Dizziness. Denies Fainting. Denies Headache.  Psychiatric: Denies Depression. Denies Anxiety. Denies Suicidal/Homicidal ideations.    All Other ROS: Negative except as stated in HPI.   Answers submitted by the patient for this visit:  Review of Systems Questionnaire (Submitted on 2/27/2024)  activity change: No  unexpected weight change: No  neck pain: No  hearing loss: No  rhinorrhea: No  trouble swallowing: No  eye discharge: No  visual disturbance: No  chest tightness: No  wheezing: No  chest pain: No  palpitations: No  blood in stool: No  constipation: No  vomiting:  No  diarrhea: No  polydipsia: No  polyuria: No  difficulty urinating: No  urgency: No  hematuria: No  joint swelling: No  arthralgias: No  headaches: No  weakness: No  confusion: No  dysphoric mood: No    Objective:     /78   Wt 57.1 kg (125 lb 12.8 oz)   BMI 19.13 kg/m²     Physical Exam    Physical Exam: LIMITED DUE TO TELEMEDICINE RESTRICTIONS.  General: Alert and oriented, No acute distress.  Head: Normocephalic, Atraumatic.  Eye: Sclera non-icteric.  Neck/Thyroid:  Full range of motion.  Respiratory: Non-labored respirations, Symmetrical chest wall expansion.  Musculoskeletal: Normal range of motion.  Integumentary: Warm, Dry, Intact, No visible suspicious lesions or rashes. No diaphoresis.   Neurologic: No focal deficits  Psychiatric: Normal interaction, Coherent speech, Euthymic mood, Appropriate affect       Assessment:       ICD-10-CM ICD-9-CM   1. Cervical radiculopathy due to degenerative joint disease of spine  M47.22 721.0   2. Lumbar radiculopathy, chronic  M54.16 724.4   3. Right hip pain  M25.551 719.45   4. Opioid dependence, uncomplicated  F11.20 304.00   5. Chronic obstructive pulmonary disease, unspecified COPD type  J44.9 496   6. Peripheral vascular disease, unspecified  I73.9 443.9        Plan:     Problem List Items Addressed This Visit          Psychiatric    Opioid dependence, uncomplicated       Pulmonary    Chronic obstructive pulmonary disease, unspecified COPD type       Cardiac/Vascular    Peripheral vascular disease, unspecified     Other Visit Diagnoses       Cervical radiculopathy due to degenerative joint disease of spine    -  Primary    Relevant Medications    HYDROcodone-acetaminophen (NORCO)  mg per tablet    Lumbar radiculopathy, chronic        Relevant Medications    HYDROcodone-acetaminophen (NORCO)  mg per tablet    Other Relevant Orders    X-Ray Lumbar Complete Including Flex And Ext    Right hip pain        Relevant Medications     HYDROcodone-acetaminophen (NORCO)  mg per tablet    Other Relevant Orders    X-Ray Hip 2 or 3 views Right (with Pelvis when performed)         1. Cervical radiculopathy due to degenerative joint disease of spine  - HYDROcodone-acetaminophen (NORCO)  mg per tablet; Take 1 tablet by mouth every 8 (eight) hours as needed for Pain.  Dispense: 90 tablet; Refill: 0  - Rx trial of increased dose of Norco to 10 mg po x q8hrs p.r.n. with close monitoring.  reviewed today. Stretching exercises encouraged. Notify M.D. or ER if symptoms persist or worsen, SI/HI, temp greater than 100.4, or any acute illness.      2. Lumbar radiculopathy, chronic  - HYDROcodone-acetaminophen (NORCO)  mg per tablet; Take 1 tablet by mouth every 8 (eight) hours as needed for Pain.  Dispense: 90 tablet; Refill: 0  - X-Ray Lumbar Complete Including Flex And Ext; Future  - Rx trial of increased dose of Norco to 10 mg po x q8hrs p.r.n. with close monitoring.  reviewed today. Stretching exercises encouraged. Notify M.D. or ER if symptoms persist or worsen, SI/HI, temp greater than 100.4, or any acute illness.      3. Right hip pain  - HYDROcodone-acetaminophen (NORCO)  mg per tablet; Take 1 tablet by mouth every 8 (eight) hours as needed for Pain.  Dispense: 90 tablet; Refill: 0  - X-Ray Hip 2 or 3 views Right (with Pelvis when performed); Future  - Rx trial of increased dose of Norco to 10 mg po x q8hrs p.r.n. with close monitoring.  reviewed today. Stretching exercises encouraged. Notify M.D. or ER if symptoms persist or worsen, SI/HI, temp greater than 100.4, or any acute illness.      4. Opioid dependence, uncomplicated  - Continue to monitor closely and establish plan to wean off once patient's pain is controlled.     5. Chronic obstructive pulmonary disease, unspecified COPD type  - Continue Combivent as prescribed, smoking cessation encouraged.     6. Peripheral vascular disease, unspecified  - Asymptomatic,  continue followup with Vascular as scheduled.       Yogesh was seen today for follow-up.    Diagnoses and all orders for this visit:    Cervical radiculopathy due to degenerative joint disease of spine  -     HYDROcodone-acetaminophen (NORCO)  mg per tablet; Take 1 tablet by mouth every 8 (eight) hours as needed for Pain.    Lumbar radiculopathy, chronic  -     HYDROcodone-acetaminophen (NORCO)  mg per tablet; Take 1 tablet by mouth every 8 (eight) hours as needed for Pain.  -     X-Ray Lumbar Complete Including Flex And Ext; Future    Right hip pain  -     HYDROcodone-acetaminophen (NORCO)  mg per tablet; Take 1 tablet by mouth every 8 (eight) hours as needed for Pain.  -     X-Ray Hip 2 or 3 views Right (with Pelvis when performed); Future    Opioid dependence, uncomplicated    Chronic obstructive pulmonary disease, unspecified COPD type    Peripheral vascular disease, unspecified          Medication List with Changes/Refills   New Medications    HYDROCODONE-ACETAMINOPHEN (NORCO)  MG PER TABLET    Take 1 tablet by mouth every 8 (eight) hours as needed for Pain.       Start Date: 2024 End Date: 3/28/2024   Current Medications    ASPIRIN (ECOTRIN) 81 MG EC TABLET    Take 81 mg by mouth once daily.       Start Date: --        End Date: --    CHOLECALCIFEROL, VITAMIN D3, (VITAMIN D3) 50 MCG (2,000 UNIT) TAB    Take by mouth once daily.       Start Date: --        End Date: --    CLOPIDOGREL (PLAVIX) 75 MG TABLET    Take 75 mg by mouth once daily.       Start Date: --        End Date: --    COMBIVENT RESPIMAT  MCG/ACTUATION INHALER    SMARTSI Spray(s) By Mouth 3 Times Daily       Start Date: 2023End Date: --    FERROUS GLUCONATE (FERGON) 324 MG TABLET    Take 324 mg by mouth daily with breakfast.       Start Date: --        End Date: --     MG TABLET    Take 600 mg by mouth every 8 (eight) hours as needed.       Start Date: 2023End Date: --     PROMETHAZINE-DEXTROMETHORPHAN (PROMETHAZINE-DM) 6.25-15 MG/5 ML SYRP    Take 5 mLs by mouth every 6 (six) hours as needed (cough).       Start Date: 9/14/2023 End Date: --   Discontinued Medications    HYDROCODONE-ACETAMINOPHEN (NORCO) 7.5-325 MG PER TABLET    Take 1 tablet by mouth every 8 (eight) hours as needed for Pain.       Start Date: 2/7/2024  End Date: 2/27/2024          Follow up as scheduled for wellness visit or sooner if symptoms worsen or fail to improve.      Audio/Video Time Documentation:  Spent 15 minutes for telemedicine visit with successful audio/visual connection. [facility] was used for billing.

## 2024-02-27 NOTE — PATIENT INSTRUCTIONS
Marin Zuñiga,     If you are due for any health screening(s) below please notify me so we can arrange them to be ordered and scheduled. Most healthy patients at your age complete them, but you are free to accept or refuse.     If you can't do it, I'll definitely understand. If you can, I'd certainly appreciate it!    Tests to Keep You Healthy    Colon Cancer Screening: DUE  Tobacco Cessation: NO      Its time for your colon cancer screening     Colorectal cancer is one of the leading causes of cancer death for men and women but it doesnt have to be. Screenings can prevent colorectal cancer or find it early enough to treat and cure the disease.     Our records indicate that you may be overdue for colon cancer screening. A colonoscopy or stool screening test can help identify patients at risk for developing colon cancer. Cancer screenings save lives, so schedule yours today to stay healthy.     A colonoscopy is the preferred test for detecting colon cancer. It is needed only once every 10 years if results are negative. While you are sedated, a flexible, lighted tube with a tiny camera is inserted into the rectum and advanced through the colon to look for cancers.     An alternative screening test that is used at home and returned to the lab may also be used. It detects hidden blood in bowel movements which could indicate cancer in the colon. If results are positive, you will need a colonoscopy to determine if the blood is a sign of cancer. This type of follow up (diagnostic) colonoscopy usually requires additional copays as required by your insurance provider.     If you recently had your colon cancer screening performed outside of [facility], please let your Health care team know so that they can update your health record. Please contact your PCP if you have any questions.    Were here to help you quit smoking     Our records indicated that you are still smoking. One of the best things you can do for your  health is to stop smoking and we are here to help.     Talk with your provider about our Smoking Cessation Program and how we can support you on your journey.

## 2024-03-25 DIAGNOSIS — M47.22 CERVICAL RADICULOPATHY DUE TO DEGENERATIVE JOINT DISEASE OF SPINE: ICD-10-CM

## 2024-03-25 DIAGNOSIS — M25.551 RIGHT HIP PAIN: ICD-10-CM

## 2024-03-25 DIAGNOSIS — M54.16 LUMBAR RADICULOPATHY, CHRONIC: ICD-10-CM

## 2024-03-25 RX ORDER — HYDROCODONE BITARTRATE AND ACETAMINOPHEN 10; 325 MG/1; MG/1
1 TABLET ORAL EVERY 8 HOURS PRN
Qty: 90 TABLET | Refills: 0 | Status: SHIPPED | OUTPATIENT
Start: 2024-03-25 | End: 2024-04-23 | Stop reason: SDUPTHER

## 2024-03-25 NOTE — TELEPHONE ENCOUNTER
----- Message from Ashley Ontiveros sent at 3/25/2024  7:22 AM CDT -----  Regarding: med refill  .Type:  RX Refill Request    Who Called: Pt   Refill or New Rx:Refill  RX Name and Strength:HYDROcodone-acetaminophen (NORCO)  mg per tablet   How is the patient currently taking it? (ex. 1XDay):  Is this a 30 day or 90 day RX:90  [facility] with phone number[id number] [place] - [address] AT [address] [facility]  Local or Mail Order:Local  Ordering Provider:Jan  Would the patient rather a call back or a response via [facility]? Call back  Best Call Back Number:[phone]  Additional Information:

## 2024-04-23 DIAGNOSIS — M54.16 LUMBAR RADICULOPATHY, CHRONIC: ICD-10-CM

## 2024-04-23 DIAGNOSIS — M25.551 RIGHT HIP PAIN: ICD-10-CM

## 2024-04-23 DIAGNOSIS — M47.22 CERVICAL RADICULOPATHY DUE TO DEGENERATIVE JOINT DISEASE OF SPINE: ICD-10-CM

## 2024-04-23 RX ORDER — HYDROCODONE BITARTRATE AND ACETAMINOPHEN 10; 325 MG/1; MG/1
1 TABLET ORAL EVERY 8 HOURS PRN
Qty: 90 TABLET | Refills: 0 | Status: SHIPPED | OUTPATIENT
Start: 2024-04-23 | End: 2024-04-23 | Stop reason: SDUPTHER

## 2024-04-23 RX ORDER — HYDROCODONE BITARTRATE AND ACETAMINOPHEN 10; 325 MG/1; MG/1
1 TABLET ORAL EVERY 8 HOURS PRN
Qty: 90 TABLET | Refills: 0 | Status: SHIPPED | OUTPATIENT
Start: 2024-04-23 | End: 2024-05-20 | Stop reason: SDUPTHER

## 2024-04-23 NOTE — TELEPHONE ENCOUNTER
----- Message from Sunil Maximino sent at 4/23/2024  3:10 PM CDT -----  .Type:  RX Refill Request    Who Called: pt  Refill or New Rx:refill  RX Name and Strength:HYDROcodone-acetaminophen (NORCO)  mg per tablet  How is the patient currently taking it? (ex. 1XDay):Take 1 tablet by mouth every 8 (eight) hours as needed for Pain. - Oral  Is this a 30 day or 90 day RX:  Preferred Pharmacy with phone number:[id number] - [place] - [address] AT [address] [facility] [[id number]]  Local or Mail Order:local  Ordering Provider:  Would the patient rather a call back or a response via [facility]? Call back  Best Call Back Number:  Additional Information: medication was sent to incorrect pharmacy

## 2024-04-23 NOTE — TELEPHONE ENCOUNTER
----- Message from Any Simms sent at 4/23/2024  1:52 PM CDT -----  Regarding: cough medication  .Type:  RX Refill Request    Who Called: pt    Refill or New Rx:refill    RX Name and Strength:promethazine-dextromethorphan (PROMETHAZINE-DM) 6.25-15 mg/5 mL Syrp 180 mL 0 9/14/2023 - No  Sig - Route: Take 5 mLs by mouth every 6 (six) hours as needed (cough). - Oral      How is the patient currently taking it? (ex. 1XDay):    Is this a 30 day or 90 day RX:    Preferred Pharmacy with phone number:[id number] - [place] - [address] AT [address] [facility]   Phone: [phone]  Fax: [phone]          Local or Mail Order:local    Ordering Provider:    Would the patient rather a call back or a response via [facility]?     Best Call Back Number:[phone]    Additional Information: pt is requesting that cough syrup be sent into the pharmacy for his cough; he states that this liquid makes him nauseous, if you can substitute it for another cough syrup that would be fine, if unable to do so, he'll accept this medication; please advise.

## 2024-04-23 NOTE — TELEPHONE ENCOUNTER
----- Message from Any Simms sent at 4/23/2024  1:50 PM CDT -----  Regarding: refill  .Type:  RX Refill Request    Who Called: pt    Refill or New Rx:refill    RX Name and Strength:HYDROcodone-acetaminophen (NORCO)  mg per tablet 90 tablet 0 3/25/2024 4/24/2024 No  Sig - Route: Take 1 tablet by mouth every 8 (eight) hours as needed for Pain. - Oral      How is the patient currently taking it? (ex. 1XDay):    Is this a 30 day or 90 day RX:    Preferred Pharmacy with phone number:[id number] - [place], [place] - [address] AT [address] [facility]   Phone: [phone]  Fax: [phone]        Local or Mail Order:local    Ordering Provider:    Would the patient rather a call back or a response via [facility]?     Best Call Back Number:[phone]    Additional Information: refill request. Please advise.

## 2024-04-30 ENCOUNTER — TELEPHONE (OUTPATIENT)
Dept: FAMILY MEDICINE | Facility: CLINIC | Age: 69
End: 2024-04-30
Payer: MEDICARE

## 2024-05-20 DIAGNOSIS — M54.16 LUMBAR RADICULOPATHY, CHRONIC: ICD-10-CM

## 2024-05-20 DIAGNOSIS — M47.22 CERVICAL RADICULOPATHY DUE TO DEGENERATIVE JOINT DISEASE OF SPINE: ICD-10-CM

## 2024-05-20 DIAGNOSIS — M25.551 RIGHT HIP PAIN: ICD-10-CM

## 2024-05-20 RX ORDER — HYDROCODONE BITARTRATE AND ACETAMINOPHEN 10; 325 MG/1; MG/1
1 TABLET ORAL EVERY 8 HOURS PRN
Qty: 90 TABLET | Refills: 0 | Status: SHIPPED | OUTPATIENT
Start: 2024-05-20 | End: 2024-06-19 | Stop reason: SDUPTHER

## 2024-05-20 NOTE — TELEPHONE ENCOUNTER
----- Message from Zay Ceron sent at 5/20/2024  4:38 PM CDT -----  .Type:  RX Refill Request    Who Called: Yogesh Guo or New Rx: Refill   RX Name and Strength: HYDROcodone-acetaminophen (NORCO)  mg per tablet and some cough medication.   How is the patient currently taking it? (ex. 1XDay):  Is this a 30 day or 90 day RX:  Preferred Pharmacy with phone number: [facility] in [place]   Local or Mail Order: Local   Ordering Provider: Jan  Would the patient rather a call back or a response via [facility]?   Best Call Back Number: [phone]  Additional Information: Please call with any questions.

## 2024-06-17 DIAGNOSIS — M54.16 LUMBAR RADICULOPATHY, CHRONIC: ICD-10-CM

## 2024-06-17 DIAGNOSIS — M25.551 RIGHT HIP PAIN: ICD-10-CM

## 2024-06-17 DIAGNOSIS — M47.22 CERVICAL RADICULOPATHY DUE TO DEGENERATIVE JOINT DISEASE OF SPINE: ICD-10-CM

## 2024-06-17 RX ORDER — HYDROCODONE BITARTRATE AND ACETAMINOPHEN 10; 325 MG/1; MG/1
1 TABLET ORAL EVERY 8 HOURS PRN
Qty: 90 TABLET | Refills: 0 | OUTPATIENT
Start: 2024-06-17 | End: 2024-07-17

## 2024-06-19 ENCOUNTER — OFFICE VISIT (OUTPATIENT)
Dept: FAMILY MEDICINE | Facility: CLINIC | Age: 69
End: 2024-06-19
Payer: MEDICARE

## 2024-06-19 VITALS
BODY MASS INDEX: 17.88 KG/M2 | DIASTOLIC BLOOD PRESSURE: 68 MMHG | HEIGHT: 68 IN | SYSTOLIC BLOOD PRESSURE: 105 MMHG | HEART RATE: 80 BPM | WEIGHT: 118 LBS | OXYGEN SATURATION: 94 % | RESPIRATION RATE: 16 BRPM

## 2024-06-19 DIAGNOSIS — M54.16 LUMBAR RADICULOPATHY, CHRONIC: ICD-10-CM

## 2024-06-19 DIAGNOSIS — M47.22 CERVICAL RADICULOPATHY DUE TO DEGENERATIVE JOINT DISEASE OF SPINE: ICD-10-CM

## 2024-06-19 DIAGNOSIS — Z00.00 MEDICARE ANNUAL WELLNESS VISIT, SUBSEQUENT: Primary | ICD-10-CM

## 2024-06-19 DIAGNOSIS — Z12.5 SCREENING PSA (PROSTATE SPECIFIC ANTIGEN): ICD-10-CM

## 2024-06-19 DIAGNOSIS — M25.551 RIGHT HIP PAIN: ICD-10-CM

## 2024-06-19 DIAGNOSIS — Z12.11 SCREENING FOR COLON CANCER: ICD-10-CM

## 2024-06-19 DIAGNOSIS — I73.9 PERIPHERAL VASCULAR DISEASE, UNSPECIFIED: ICD-10-CM

## 2024-06-19 DIAGNOSIS — Z72.0 TOBACCO USE: ICD-10-CM

## 2024-06-19 DIAGNOSIS — F32.A DEPRESSION, UNSPECIFIED DEPRESSION TYPE: ICD-10-CM

## 2024-06-19 DIAGNOSIS — R79.9 ABNORMAL FINDING OF BLOOD CHEMISTRY, UNSPECIFIED: ICD-10-CM

## 2024-06-19 RX ORDER — HYDROCODONE BITARTRATE AND ACETAMINOPHEN 10; 325 MG/1; MG/1
1 TABLET ORAL EVERY 8 HOURS PRN
Qty: 90 TABLET | Refills: 0 | Status: SHIPPED | OUTPATIENT
Start: 2024-06-19 | End: 2024-06-19 | Stop reason: SDUPTHER

## 2024-06-19 RX ORDER — HYDROCODONE BITARTRATE AND ACETAMINOPHEN 10; 325 MG/1; MG/1
1 TABLET ORAL EVERY 8 HOURS PRN
Qty: 90 TABLET | Refills: 0 | Status: SHIPPED | OUTPATIENT
Start: 2024-06-19 | End: 2024-07-19

## 2024-06-19 RX ORDER — DULOXETIN HYDROCHLORIDE 30 MG/1
30 CAPSULE, DELAYED RELEASE ORAL DAILY
Qty: 30 CAPSULE | Refills: 2 | Status: SHIPPED | OUTPATIENT
Start: 2024-06-19 | End: 2024-09-17

## 2024-06-19 RX ORDER — DULOXETIN HYDROCHLORIDE 30 MG/1
30 CAPSULE, DELAYED RELEASE ORAL DAILY
Qty: 30 CAPSULE | Refills: 2 | Status: SHIPPED | OUTPATIENT
Start: 2024-06-19 | End: 2024-06-19 | Stop reason: SDUPTHER

## 2024-06-19 NOTE — PATIENT INSTRUCTIONS
Marin Zuñiga,     If you are due for any health screening(s) below please notify me so we can arrange them to be ordered and scheduled. Most healthy patients at your age complete them, but you are free to accept or refuse.     If you can't do it, I'll definitely understand. If you can, I'd certainly appreciate it!    Tests to Keep You Healthy    Colon Cancer Screening: ORDERED  Tobacco Cessation: NO      Its time for your colon cancer screening     Colorectal cancer is one of the leading causes of cancer death for men and women but it doesnt have to be. Screenings can prevent colorectal cancer or find it early enough to treat and cure the disease.     Our records indicate that you may be overdue for colon cancer screening. A colonoscopy or stool screening test can help identify patients at risk for developing colon cancer. Cancer screenings save lives, so schedule yours today to stay healthy.     A colonoscopy is the preferred test for detecting colon cancer. It is needed only once every 10 years if results are negative. While you are sedated, a flexible, lighted tube with a tiny camera is inserted into the rectum and advanced through the colon to look for cancers.     An alternative screening test that is used at home and returned to the lab may also be used. It detects hidden blood in bowel movements which could indicate cancer in the colon. If results are positive, you will need a colonoscopy to determine if the blood is a sign of cancer. This type of follow up (diagnostic) colonoscopy usually requires additional copays as required by your insurance provider.     If you recently had your colon cancer screening performed outside of [facility], please let your Health care team know so that they can update your health record. Please contact your PCP if you have any questions.

## 2024-06-19 NOTE — TELEPHONE ENCOUNTER
----- Message from Sunil Maximino sent at 6/19/2024  3:43 PM CDT -----  .Who Called: Yogesh Soto    Refill or New Rx:Refill  RX Name and Strength:DULoxetine (CYMBALTA) 30 MG capsule  How is the patient currently taking it? (ex. 1XDay):Route: Take 1 capsule (30 mg total) by mouth once daily. - Oral  Is this a 30 day or 90 day RX:30 day  Local or Mail Order:local  List of preferred pharmacies on file (remove unneeded): [id number] - [place], [place] - [address] AT [facility] [[id number]]  Ordering Provider:    Refill or New Rx:Refill  RX Name and Strength:HYDROcodone-acetaminophen (NORCO)  mg per tablet  How is the patient currently taking it? (ex. 1XDay):Sig - Route: Take 1 tablet by mouth every 8 (eight) hours as needed for Pain. - Oral  Is this a 30 day or 90 day RX:30 day  Local or Mail Order:local  List of preferred pharmacies on file (remove unneeded):[id number] - [place], [place] - [address] AT [facility] [[id number]]  Ordering Provider:    Preferred Method of Contact: Phone Call  Patient's Preferred Phone Number on File: [phone]   Best Call Back Number, if different:  Additional Information: medication sent to incorrect pharmacy, [id number] - [place], [place] - [address] AT [facility] [[id number]]

## 2024-06-19 NOTE — PROGRESS NOTES
Patient ID: [id number]     Chief Complaint: Medication Refill        HPI:     Yogesh Soto is a 69 y.o. male here today for a Medicare Wellness.   Well Adult History   The patient presents for well adult exam. The patient's general health status is described as good. The patient's diet is described as balanced. Exercise: occasional. Associated symptoms consist of denies weight loss, denies weight gain, denies fatigue, denies headache, denies hearing loss and denies vision changes. Additional pertinent history: last dental exam: 2019 (he has dental insurance and will schedule an appt. next available), last eye exam: 2018 (wears reading eyeglasses, he has an appointment scheduled at [facility] on 06/21/2024), seat belt use, occasional caffeine use (coffee), tobacco use 2-3 cigarettes x several years, he quit smoking 2 weeks ago, no alcohol use. He refuses Tdap, Flu vaccine, RSV vaccine, COVID-19 vaccine, and Shingrix, risks of refusal discussed with patient voicing understanding. History of PAD s/p PTA right leg with Cardiology (Dr. Rutherford), controlled with Rx, no side effects, asymptomatic, compliant with followup as scheduled, but hasn't seen him in over a year, he would like referral to Cardiology in [place]. He denies family history colon cancer, he would like Cologuard ordered. Patient complains of chronic neck, left shoulder, and low back x > 2 years. He reports that he rode horses back in the day and a horse fell on him in the past. 9/10 on pain scale, constant, worse with activity, pain is sharp and throbbing, worse with standing and raising his arm as well, he is taking Norco with improvement of pain, no side effects, he tried to get in with pain management without success, he needs a refill of [place] today. He has severe DJD C-spine, DJD L-spine, and left rotator cuff tear, but he refuses surgery, risks of refusal discussed with patient and patient voices understanding. He has tried PT in the past  without resolution of symptoms.   - Patient complains of depression x several months due to stress at home. He denies anxiety, SI/HI, AH/VH, or family history of mental health problems. He would like trial of Rx. He is not interested in referral to outpatient counseling.   -  Patient is without any other complaints today.    denies Urinary leakage.  denies Recent falls or balance difficulty.   admits to Daily exercise or physical activity.  admits to Depression, stress, anxiety, or emotional lability.   denies The need for healthcare treatment including a cane/walker, blood pressure monitoring, or regular vision/hearing tests.     A separate E/M code has been provided to evaluate additional complaints that the patient would like addressed during the dedicated Medicare Wellness Exam.    Patient Care Team:  Rosalinda Montoya MD as PCP - General (Family Medicine)  Jasmin Fishman NP (Nurse Practitioner)  Mariaelena Saleh NP as Nurse Practitioner (Nurse Practitioner)     Opioid Screening: Patient medication list reviewed, patient is taking prescription opioids. Patient is not using additional opioids than prescribed. Patient is not at low risk of substance abuse based on this opioid use history.      Advance Care Planning     Date: 06/19/2024  Patient did not wish or was not able to name a surrogate decision maker or provide an Advance Care Plan.          -------------------------------------    Cervical radiculopathy    Insomnia    Osteopenia    PVD (peripheral vascular disease)        Past Surgical History:   Procedure Laterality Date    CEREBRAL ANGIOGRAM         Review of patient's allergies indicates:  No Known Allergies    Outpatient Medications Marked as Taking for the 6/19/24 encounter (Office Visit) with Rosalinda Montoya MD   Medication Sig Dispense Refill    aspirin (ECOTRIN) 81 MG EC tablet Take 81 mg by mouth once daily.      cholecalciferol, vitamin D3, (VITAMIN D3) 50 mcg (2,000 unit) Tab  Take by mouth once daily.      clopidogreL (PLAVIX) 75 mg tablet Take 75 mg by mouth once daily.      COMBIVENT RESPIMAT  mcg/actuation inhaler SMARTSI Spray(s) By Mouth 3 Times Daily      ferrous gluconate (FERGON) 324 MG tablet Take 324 mg by mouth daily with breakfast.       mg tablet Take 600 mg by mouth every 8 (eight) hours as needed.      promethazine-dextromethorphan (PROMETHAZINE-DM) 6.25-15 mg/5 mL Syrp Take 5 mLs by mouth every 6 (six) hours as needed (cough). 180 mL 0    [DISCONTINUED] HYDROcodone-acetaminophen (NORCO)  mg per tablet Take 1 tablet by mouth every 8 (eight) hours as needed for Pain. 90 tablet 0       Social History     Socioeconomic History    Marital status: Single   Tobacco Use    Smoking status: Former     Current packs/day: 0.00     Types: Cigarettes     Quit date:      Years since quittin.4    Smokeless tobacco: Never    Tobacco comments:     3 cigarettes a week   Substance and Sexual Activity    Alcohol use: Yes     Alcohol/week: 1.0 standard drink of alcohol     Types: 1 Cans of beer per week     Comment: 1 a week    Drug use: Never    Sexual activity: Not Currently   Social History Narrative    ** Merged History Encounter **          Social Determinants of Health     Financial Resource Strain: Low Risk  (2024)    Overall Financial Resource Strain (CARDIA)     Difficulty of Paying Living Expenses: Not hard at all   Food Insecurity: No Food Insecurity (2024)    Hunger Vital Sign     Worried About Running Out of Food in the Last Year: Never true     Ran Out of Food in the Last Year: Never true   Transportation Needs: No Transportation Needs (2024)    PRAPARE - Transportation     Lack of Transportation (Medical): No     Lack of Transportation (Non-Medical): No   Physical Activity: Inactive (2024)    Exercise Vital Sign     Days of Exercise per Week: 0 days     Minutes of Exercise per Session: 10 min   Stress: No Stress Concern Present  "(2/27/2024)    [employer] - Occupational Stress Questionnaire     Feeling of Stress : Not at all   Housing Stability: Low Risk  (2/27/2024)    Housing Stability Vital Sign     Unable to Pay for Housing in the Last Year: No     Number of Places Lived in the Last Year: 1     Unstable Housing in the Last Year: No        Family History   Problem Relation Name Age of Onset    Hypertension Mother      Hypertension Brother          Subjective:     ROS      See HPI for details    Constitutional: No fever, No chills, No fatigue.   Eye: No blurring, No visual disturbances.   Ear/Nose/Mouth/Throat: No decreased hearing, No ear pain, No nasal congestion, No sore throat.   Respiratory: No shortness of breath, No cough, No wheezing.   Cardiovascular: No chest pain, No palpitations, No peripheral edema.   Gastrointestinal: No nausea, No vomiting, No diarrhea, No constipation, No abdominal pain.   Genitourinary: No dysuria, No hematuria.   Hematology/Lymphatics: No bruising tendency, No bleeding tendency, No swollen lymph glands.   Endocrine: No excessive thirst, No polyuria, No excessive hunger.   Musculoskeletal: As per HPI; No muscle pain, No decreased range of motion.   Integumentary: No rash, No pruritus.   Neurologic: As per HPI; No abnormal balance, No confusion, No headache.   Psychiatric: No anxiety, Reports depression, Not suicidal, No hallucinations.     All Other ROS: Negative except as stated in HPI.       Objective:     /68 (BP Location: Left arm, Patient Position: Sitting, BP Method: Medium (Automatic))   Pulse 80   Resp 16   Ht 5' 8" (1.727 m)   Wt 53.5 kg (118 lb)   SpO2 (!) 94%   BMI 17.94 kg/m²     Physical Exam    General: Alert and oriented, No acute distress.   Eye: Pupils are equal, round and reactive to light, Normal conjunctiva.   HENT: Normocephalic, Tympanic membranes are clear, Normal hearing, Oral mucosa is moist, No pharyngeal erythema.   Throat: Pharynx ( Not " edematous, No exudate ).   Neck: Supple, Non-tender, No carotid bruit, No lymphadenopathy, No thyromegaly.   Respiratory: Lungs are clear to auscultation, Respirations are non-labored, Breath sounds are equal, Symmetrical chest wall expansion, No chest wall tenderness.   Cardiovascular: Normal rate, Regular rhythm, No murmur, Good pulses equal in all extremities, No edema.   Gastrointestinal: Soft, Non-tender, Non-distended, Normal bowel sounds, No organomegaly.   Genitourinary: No costovertebral angle tenderness.   Musculoskeletal: Normal range of motion, Normal gait. Bilateral cervical spine with mild posterior TTP, no erythema, no effusion, no deformity, bilateral trapezius muscle spasms. Left shoulder with decreased abduction due to pain, no erythema, no effusion, no deformity, no crepitus. Bilateral lumbar with mild posterior TTP, no effusion, no deformity, bilateral paraspinal muscle spasms, bilateral straight leg raise.  Neurologic: No focal deficits, Cranial Nerves II-XII are grossly intact.   Psychiatric: Cooperative, Appropriate mood & affect, Normal judgment, Non-suicidal.   Mood and affect: Calm.   Behavior: Relaxed.     Tobacco use Counseling    Smoking Cessation History: Not obtained at triage    Discussion included:  Past attempts and outcomes: None.  Other smokers in the house: No.    Assessment:  Readiness to Quit: Not interested in quitting    Plan:  Quit date: None.  Counseling  Time spent: 3 to 10 minutes.  Topics covered:  Tobacco proof home and car-Yes.  Changing daily routines-Yes.  Dealing with urges to smoke/avoiding triggers-Yes.  Getting support- Yes.  Teach behavior skills- Yes.  Reinforce benefits- Yes.  Other-   Pharmacotherapy: Not indicated.  Education provided: Patient education selected. Offered at check out & available in the patient portal.  Follow up plan: See orders.      Assessment:       ICD-10-CM ICD-9-CM   1. Medicare annual wellness visit, subsequent  Z00.00 V70.0   2.  Screening PSA (prostate specific antigen)  Z12.5 V76.44   3. Screening for colon cancer  Z12.11 V76.51   4. Cervical radiculopathy due to degenerative joint disease of spine  M47.22 721.0   5. Lumbar radiculopathy, chronic  M54.16 724.4   6. Peripheral vascular disease, unspecified  I73.9 443.9   7. Tobacco use  Z72.0 305.1   8. Depression, unspecified depression type  F32.A 311   9. Right hip pain  M25.551 719.45   10. Abnormal finding of blood chemistry, unspecified  R79.9 790.6        Plan:       Medicare Annual Wellness and Personalized Prevention Plan:     Fall Risk + Home Safety + Hearing Impairment + Depression Screen + Cognitive Impairment Screen + Health Risk Assessment all reviewed.          No data to display                  6/19/2024     2:19 PM 1/10/2024     1:16 PM 6/21/2023     9:41 AM 1/13/2023     8:05 AM 5/23/2022     1:50 PM   Depression Screeening PHQ2   Over the last two weeks how often have you been bothered by little interest or pleasure in doing things 0 0 1 0 0   Over the last two weeks how often have you been bothered by feeling down, depressed or hopeless 0 0 1 0 0   PHQ-2 Total Score 0 0 2 0 0         6/19/2024     2:30 PM 2/27/2024     3:30 PM 1/10/2024     1:00 PM 6/21/2023     9:45 AM 1/13/2023     8:00 AM 5/23/2022     2:00 PM   Fall Risk Assessment - Outpatient   Mobility Status Ambulatory Ambulatory Ambulatory Ambulatory Ambulatory Ambulatory   Number of falls 1 1 0 0 1 0   Identified as fall risk False False False False False False             What [age] 65-69  Are you male or female?: Male  During the past four weeks, how much have you been bothered by emotional problems such as feeling anxious, depressed, irritable, sad, or downhearted and blue?: Not at all  During the past five weeks, has your physical and/or emotional health limited your social activities with family, friends, neighbors, or groups?: Not at all  During the past four weeks, how much bodily pain have you  generally had?: No pain  During the past four weeks, was someone available to help if you needed and wanted help?: Yes, as much as I wanted  During the past four weeks, what was the hardest physical activity you could do for at least two minutes?: Moderate  Can you get to places out of walking distance without help?  (For example, can you travel alone on buses or taxis, or drive your own car?): Yes  Can you go shopping for groceries or clothes without someone's help?: Yes  Can you prepare your own meals?: Yes  Can you do your own housework without help?: Yes  Because of any health problems, do you need the help of another person with your personal care needs such as eating, bathing, dressing, or getting around the house?: No  Can you handle your own money without help?: Yes  During the past four weeks, how would you rate your health in general?: Good  How have things been going for you during the past four weeks?: Pretty well  Are you having difficulties driving your car?: No  Do you always fasten your seat belt when you are in a car?: Yes, usually  How often in the past four weeks have you been bothered by falling or dizzy when standing up?: Never  How often in the past four weeks have you been bothered by sexual problems?: Never  How often in the past four weeks have you been bothered by trouble eating well?: Never  How often in the past four weeks have you been bothered by teeth or denture problems?: Never  How often in the past four weeks have you been bothered with problems using the telephone?: Never  How often in the past four weeks have you been bothered by tiredness or fatigue?: Never  Have you fallen two or more times in the past year?: No  Are you afraid of falling?: Yes  Are you a smoker?: No  During the past four weeks, how many drinks of wine, beer, or other alcoholic beverages did you have?: No alcohol at all  Do you exercise for about 20 minutes three or more days a week?: Yes, some of the time  Have  you been given any information to help you with hazards in your house that might hurt you?: Yes  Have you been given any information to help you with keeping track of your medications?: Yes  How often do you have trouble taking medicines the way you've been told to take them?: I always take them as prescribed  How confident are you that you can control and manage most of your health problems?: Very confident  What is your race? (Check all that apply.):                  Alcohol/Tobacco Use - Stressed importance of smoking cessation and limiting alcohol intake.  CVD Risk Factors - Reviewed  Obesity/Physical Activity - Normal BMI. Encouraged daily 30 minute physical activity x 5 days per week.      Health Maintenance Topics with due status: Not Due       Topic Last Completion Date    TETANUS VACCINE 03/10/2019    Lipid Panel 11/21/2019          Vaccinations -   Immunization History   Administered Date(s) Administered    COVID-19 MRNA, LN-S PF (MODERNA HALF 0.25 ML DOSE) 12/28/2021    COVID-19 Vaccine 03/16/2021, 04/27/2021, 12/28/2021    COVID-19, MRNA, LN-S, PF (MODERNA FULL 0.5 ML DOSE) 03/16/2021, 04/27/2021    Tdap 03/10/2019          1. Medicare annual wellness visit, subsequent  - Comprehensive Metabolic Panel; Future  - Hemoglobin A1C; Future  - Lipid Panel; Future  - TSH; Future  - Urinalysis, Reflex to Urine Culture; Future  - Will treat pending lab results. Keep appointment for dental exams x q6 months as scheduled. Keep appointment for annual eye exam as scheduled. Keep appointment with specialists as scheduled. Notify M.D. or ER if temp greater than 100.4, or any acute illness.      2. Screening PSA (prostate specific antigen)  - PSA, Screening; Future    3. Screening for colon cancer  - Cologuard Screening (Multitarget Stool DNA); Future  - Cologuard Screening (Multitarget Stool DNA)    4. Cervical radiculopathy due to degenerative joint disease of spine  - HYDROcodone-acetaminophen (NORCO)   mg per tablet; Take 1 tablet by mouth every 8 (eight) hours as needed for Pain.  Dispense: 90 tablet; Refill: 0  - Rx Norco to 10 mg po x q8hrs p.r.n. refilled today with close monitoring.  reviewed today. Stretching exercises encouraged. Notify M.D. or ER if symptoms persist or worsen, SI/HI, temp greater than 100.4, or any acute illness.      5. Lumbar radiculopathy, chronic  - HYDROcodone-acetaminophen (NORCO)  mg per tablet; Take 1 tablet by mouth every 8 (eight) hours as needed for Pain.  Dispense: 90 tablet; Refill: 0  - Same as #4.    6. Peripheral vascular disease, unspecified  - Ambulatory referral/consult to Cardiology; Future for evaluation and treatment. Continue Plavix as prescribed. Keep daily BP log. Notify M.D. or ER if BP >170/100 or <90/60, chest pain, palpitations, headache, SOB, temp greater than 100.4, or any acute illness.   Continue  Low Sodium Diet (DASH Diet - Less than 2 grams of sodium per day).  Monitor blood pressure daily and log. Report consistent numbers greater than 140/90.  Smoking cessation encouraged to aid in BP reduction.  Maintain healthy weight with goal BMI <30. Exercise 30 minutes per day, 5 days per week.      7. Tobacco use  - Smoking cessation encouraged.     8. Depression, unspecified depression type  - Rx trial of DULoxetine (CYMBALTA) 30 MG capsule; Take 1 capsule (30 mg total) by mouth once daily.  Dispense: 30 capsule; Refill: 2  - CBC Auto Differential; Future  - Continue relaxation techniques. Will titrate medication as needed/tolerated. Notify M.D. or ER if symptoms persist or worsen, SI/HI, temp greater than 100.4, or any acute illness.    Continue  Read positive daily meditations, avoid negative media, set healthy boundaries.  Exercise daily, keep consistent sleep pattern, eat a healthy diet.  Establish good social support, make changes to reduce stress.  Reports any symptoms of suicidal/homicidal ideations or self harm immediately, if clinic is closed  go to nearest emergency room.    9. Right hip pain  - HYDROcodone-acetaminophen (NORCO)  mg per tablet; Take 1 tablet by mouth every 8 (eight) hours as needed for Pain.  Dispense: 90 tablet; Refill: 0  - Same as #4.     10. Abnormal finding of blood chemistry, unspecified  - Hemoglobin A1C; Future        Medication List with Changes/Refills   New Medications    DULOXETINE (CYMBALTA) 30 MG CAPSULE    Take 1 capsule (30 mg total) by mouth once daily.       Start Date: 2024 End Date: 2024   Current Medications    ASPIRIN (ECOTRIN) 81 MG EC TABLET    Take 81 mg by mouth once daily.       Start Date: --        End Date: --    CHOLECALCIFEROL, VITAMIN D3, (VITAMIN D3) 50 MCG (2,000 UNIT) TAB    Take by mouth once daily.       Start Date: --        End Date: --    CLOPIDOGREL (PLAVIX) 75 MG TABLET    Take 75 mg by mouth once daily.       Start Date: --        End Date: --    COMBIVENT RESPIMAT  MCG/ACTUATION INHALER    SMARTSI Spray(s) By Mouth 3 Times Daily       Start Date: 2023End Date: --    FERROUS GLUCONATE (FERGON) 324 MG TABLET    Take 324 mg by mouth daily with breakfast.       Start Date: --        End Date: --     MG TABLET    Take 600 mg by mouth every 8 (eight) hours as needed.       Start Date: 2023End Date: --    PROMETHAZINE-DEXTROMETHORPHAN (PROMETHAZINE-DM) 6.25-15 MG/5 ML SYRP    Take 5 mLs by mouth every 6 (six) hours as needed (cough).       Start Date: 2023 End Date: --   Changed and/or Refilled Medications    Modified Medication Previous Medication    HYDROCODONE-ACETAMINOPHEN (NORCO)  MG PER TABLET HYDROcodone-acetaminophen (NORCO)  mg per tablet       Take 1 tablet by mouth every 8 (eight) hours as needed for Pain.    Take 1 tablet by mouth every 8 (eight) hours as needed for Pain.       Start Date: 2024 End Date: 2024    Start Date: 2024 End Date: 2024          The patient's Health Maintenance was reviewed and the  following appears to be due at this time:   Health Maintenance Due   Topic Date Due    Colorectal Cancer Screening  Never done         Follow up in about 3 months (around 9/19/2024) for Chronic pain, depression followup- 30 minute appointment.

## 2024-07-16 DIAGNOSIS — M25.551 RIGHT HIP PAIN: ICD-10-CM

## 2024-07-16 DIAGNOSIS — M47.22 CERVICAL RADICULOPATHY DUE TO DEGENERATIVE JOINT DISEASE OF SPINE: ICD-10-CM

## 2024-07-16 DIAGNOSIS — M54.16 LUMBAR RADICULOPATHY, CHRONIC: ICD-10-CM

## 2024-07-16 RX ORDER — HYDROCODONE BITARTRATE AND ACETAMINOPHEN 10; 325 MG/1; MG/1
1 TABLET ORAL EVERY 8 HOURS PRN
Qty: 90 TABLET | Refills: 0 | Status: SHIPPED | OUTPATIENT
Start: 2024-07-16 | End: 2024-08-15

## 2024-07-16 NOTE — TELEPHONE ENCOUNTER
----- Message from Rachid Arredondo sent at 7/16/2024  9:37 AM CDT -----  Regarding: refill request  Who Called: Yogesh Soto    Refill or New Rx:Refill  RX Name and Strength:HYDROcodone-acetaminophen (NORCO)  mg per tablet  How is the patient currently taking it? (ex. 1XDay):1  Is this a 30 day or 90 day RX:90  Local or Mail Order:local  List of preferred pharmacies on file (remove unneeded):[facility] #[id number] - [place], [place] - [address] AT [facility]   Phone: [phone]  Fax: [phone]        If different Pharmacy is requested, enter Pharmacy information here including location and phone number:    Ordering Provider:      Preferred Method of Contact: Phone Call  Patient's Preferred Phone Number on File: [phone]   Best Call Back Number, if different:  Additional Information: Pt is requesting a refill on medication .

## 2024-07-17 ENCOUNTER — PATIENT MESSAGE (OUTPATIENT)
Facility: CLINIC | Age: 69
End: 2024-07-17
Payer: MEDICARE

## 2024-07-22 ENCOUNTER — DOCUMENTATION ONLY (OUTPATIENT)
Dept: FAMILY MEDICINE | Facility: CLINIC | Age: 69
End: 2024-07-22
Payer: MEDICARE

## 2024-08-15 DIAGNOSIS — M54.16 LUMBAR RADICULOPATHY, CHRONIC: ICD-10-CM

## 2024-08-15 DIAGNOSIS — M25.551 RIGHT HIP PAIN: ICD-10-CM

## 2024-08-15 DIAGNOSIS — M47.22 CERVICAL RADICULOPATHY DUE TO DEGENERATIVE JOINT DISEASE OF SPINE: ICD-10-CM

## 2024-08-15 RX ORDER — HYDROCODONE BITARTRATE AND ACETAMINOPHEN 10; 325 MG/1; MG/1
1 TABLET ORAL EVERY 8 HOURS PRN
Qty: 90 TABLET | Refills: 0 | Status: SHIPPED | OUTPATIENT
Start: 2024-08-15 | End: 2024-09-14

## 2024-08-15 NOTE — TELEPHONE ENCOUNTER
----- Message from Lowell Royal sent at 8/15/2024  1:48 PM CDT -----  .Who Called: Yogesh Soto    Refill or New Rx:New Rx    RX Name and Strength:HYDROcodone-acetaminophen (NORCO)  mg per tablet    How is the patient currently taking it? (ex. 1XDay):Sig - Route: Take 1 tablet by mouth every 8 (eight) hours as needed for Pain. - Oral    Is this a 30 day or 90 day RX:90    Local or Mail Order: Local    List of preferred pharmacies on file (remove unneeded): [facility] #[id number] - [place], [place] - [address] AT [facility]   Phone: [phone]  Fax: [phone]    Ordering Provider: Dr. Montoya    Preferred Method of Contact: Phone Call    Patient's Preferred Phone Number on File: [phone]     Best Call Back Number, if different:    Additional Information: n/a

## 2024-09-09 DIAGNOSIS — M54.16 LUMBAR RADICULOPATHY, CHRONIC: ICD-10-CM

## 2024-09-09 DIAGNOSIS — M47.22 CERVICAL RADICULOPATHY DUE TO DEGENERATIVE JOINT DISEASE OF SPINE: ICD-10-CM

## 2024-09-09 DIAGNOSIS — M25.551 RIGHT HIP PAIN: ICD-10-CM

## 2024-09-09 RX ORDER — HYDROCODONE BITARTRATE AND ACETAMINOPHEN 10; 325 MG/1; MG/1
1 TABLET ORAL EVERY 8 HOURS PRN
Qty: 90 TABLET | Refills: 0 | Status: SHIPPED | OUTPATIENT
Start: 2024-09-09 | End: 2024-10-09

## 2024-09-09 NOTE — TELEPHONE ENCOUNTER
----- Message from Jerrica Charles sent at 9/9/2024  1:16 PM CDT -----  .Type:  RX Refill Request    Who Called: pt   Refill or New Rx:refill   RX Name and Strength:HYDROcodone-acetaminophen (NORCO)  mg per tablet  How is the patient currently taking it? (ex. 1XDay):Take 1 tablet by mouth every 8 (eight) hours as needed for Pain. - Oral  Is this a 30 day or 90 day RX:  Preferred Pharmacy with phone number:[facility] - [place], [place] - [address] [place] [address]      Local or Mail Order:local   Ordering Provider:ade   Would the patient rather a call back or a response via [facility]? Call back   Best Call Back Number: states that he lost his phone   Additional Information: pt is requesting that we send it in earlier because he feel off of the  and took his last one today

## 2024-09-23 DIAGNOSIS — J44.9 CHRONIC OBSTRUCTIVE PULMONARY DISEASE, UNSPECIFIED COPD TYPE: ICD-10-CM

## 2024-09-23 RX ORDER — PROMETHAZINE HYDROCHLORIDE AND DEXTROMETHORPHAN HYDROBROMIDE 6.25; 15 MG/5ML; MG/5ML
5 SYRUP ORAL EVERY 6 HOURS PRN
Qty: 180 ML | Refills: 0 | Status: SHIPPED | OUTPATIENT
Start: 2024-09-23

## 2024-09-23 NOTE — TELEPHONE ENCOUNTER
----- Message from Zay Ceron sent at 9/23/2024  1:51 PM CDT -----  .Type:  RX Refill Request    Who Called: Yogesh  [place] or New Rx: Refill   RX Name and Strength: promethazine-dextromethorphan (PROMETHAZINE-DM) 6.25-15 mg/5 mL Syrp  How is the patient currently taking it? (ex. 1XDay):  Is this a 30 day or 90 day RX:  Preferred Pharmacy with phone number: [facility] in [place]    Local or Mail Order: local   Ordering Provider: Jan  Would the patient rather a call back or a response via [facility]?   Best Call Back Number: [phone]  Additional Information: Please call him back with any questions.

## 2024-10-03 DIAGNOSIS — M25.551 RIGHT HIP PAIN: ICD-10-CM

## 2024-10-03 DIAGNOSIS — M54.16 LUMBAR RADICULOPATHY, CHRONIC: ICD-10-CM

## 2024-10-03 DIAGNOSIS — M47.22 CERVICAL RADICULOPATHY DUE TO DEGENERATIVE JOINT DISEASE OF SPINE: ICD-10-CM

## 2024-10-03 RX ORDER — HYDROCODONE BITARTRATE AND ACETAMINOPHEN 10; 325 MG/1; MG/1
1 TABLET ORAL EVERY 8 HOURS PRN
Qty: 90 TABLET | Refills: 0 | Status: SHIPPED | OUTPATIENT
Start: 2024-10-03 | End: 2024-11-02

## 2024-10-03 NOTE — TELEPHONE ENCOUNTER
----- Message from Sunil sent at 10/3/2024 12:01 PM CDT -----  .Who Called: Yogesh Soto    Refill or New Rx:Refill  RX Name and Strength:HYDROcodone-acetaminophen (NORCO)  mg per tablet  How is the patient currently taking it? (ex. 1XDay):  Is this a 30 day or 90 day RX:  Local or Mail Order: local  List of preferred pharmacies on file (remove unneeded):[facility] [place]   Ordering Provider:      Preferred Method of Contact: Phone Call  Patient's Preferred Phone Number on File: [phone]   Best Call Back Number, if different:  Additional Information:

## 2024-10-23 DIAGNOSIS — M54.16 LUMBAR RADICULOPATHY, CHRONIC: ICD-10-CM

## 2024-10-23 DIAGNOSIS — M25.551 RIGHT HIP PAIN: ICD-10-CM

## 2024-10-23 DIAGNOSIS — M47.22 CERVICAL RADICULOPATHY DUE TO DEGENERATIVE JOINT DISEASE OF SPINE: ICD-10-CM

## 2024-10-23 RX ORDER — HYDROCODONE BITARTRATE AND ACETAMINOPHEN 10; 325 MG/1; MG/1
1 TABLET ORAL EVERY 8 HOURS PRN
Qty: 90 TABLET | Refills: 0 | OUTPATIENT
Start: 2024-10-23 | End: 2024-11-22
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
## 2024-10-29 ENCOUNTER — PATIENT MESSAGE (OUTPATIENT)
Facility: CLINIC | Age: 69
End: 2024-10-29
Payer: MEDICARE

## 2024-10-29 DIAGNOSIS — M54.16 LUMBAR RADICULOPATHY, CHRONIC: ICD-10-CM

## 2024-10-29 DIAGNOSIS — M47.22 CERVICAL RADICULOPATHY DUE TO DEGENERATIVE JOINT DISEASE OF SPINE: ICD-10-CM

## 2024-10-29 DIAGNOSIS — M25.551 RIGHT HIP PAIN: ICD-10-CM

## 2024-10-29 RX ORDER — HYDROCODONE BITARTRATE AND ACETAMINOPHEN 10; 325 MG/1; MG/1
1 TABLET ORAL EVERY 8 HOURS PRN
Qty: 21 TABLET | Refills: 0 | Status: SHIPPED | OUTPATIENT
Start: 2024-10-29 | End: 2024-11-05

## 2024-10-31 ENCOUNTER — PATIENT OUTREACH (OUTPATIENT)
Facility: CLINIC | Age: 69
End: 2024-10-31
Payer: MEDICARE

## 2024-10-31 DIAGNOSIS — M25.551 RIGHT HIP PAIN: ICD-10-CM

## 2024-10-31 DIAGNOSIS — M54.16 LUMBAR RADICULOPATHY, CHRONIC: ICD-10-CM

## 2024-10-31 DIAGNOSIS — M47.22 CERVICAL RADICULOPATHY DUE TO DEGENERATIVE JOINT DISEASE OF SPINE: ICD-10-CM

## 2024-10-31 RX ORDER — HYDROCODONE BITARTRATE AND ACETAMINOPHEN 10; 325 MG/1; MG/1
1 TABLET ORAL EVERY 8 HOURS PRN
Qty: 21 TABLET | Refills: 0 | OUTPATIENT
Start: 2024-10-31 | End: 2024-11-07

## 2024-11-05 ENCOUNTER — OFFICE VISIT (OUTPATIENT)
Dept: FAMILY MEDICINE | Facility: CLINIC | Age: 69
End: 2024-11-05
Payer: MEDICARE

## 2024-11-05 VITALS
TEMPERATURE: 99 F | DIASTOLIC BLOOD PRESSURE: 65 MMHG | WEIGHT: 117.94 LBS | BODY MASS INDEX: 17.88 KG/M2 | HEART RATE: 81 BPM | HEIGHT: 68 IN | OXYGEN SATURATION: 99 % | SYSTOLIC BLOOD PRESSURE: 135 MMHG | RESPIRATION RATE: 18 BRPM

## 2024-11-05 DIAGNOSIS — M54.16 CHRONIC RADICULAR LUMBAR PAIN: ICD-10-CM

## 2024-11-05 DIAGNOSIS — Z72.0 TOBACCO USE: ICD-10-CM

## 2024-11-05 DIAGNOSIS — G89.29 CHRONIC RADICULAR LUMBAR PAIN: ICD-10-CM

## 2024-11-05 DIAGNOSIS — G89.29 CHRONIC PAIN OF BOTH SHOULDERS: ICD-10-CM

## 2024-11-05 DIAGNOSIS — M47.22 CERVICAL RADICULOPATHY DUE TO DEGENERATIVE JOINT DISEASE OF SPINE: ICD-10-CM

## 2024-11-05 DIAGNOSIS — M54.16 LUMBAR RADICULOPATHY, CHRONIC: ICD-10-CM

## 2024-11-05 DIAGNOSIS — M25.512 CHRONIC PAIN OF BOTH SHOULDERS: ICD-10-CM

## 2024-11-05 DIAGNOSIS — M25.551 RIGHT HIP PAIN: ICD-10-CM

## 2024-11-05 DIAGNOSIS — F32.A DEPRESSION, UNSPECIFIED DEPRESSION TYPE: ICD-10-CM

## 2024-11-05 DIAGNOSIS — M54.12 CERVICAL RADICULOPATHY, CHRONIC: Primary | ICD-10-CM

## 2024-11-05 DIAGNOSIS — M25.511 CHRONIC PAIN OF BOTH SHOULDERS: ICD-10-CM

## 2024-11-05 PROBLEM — M54.50 CHRONIC RIGHT-SIDED LOW BACK PAIN: Status: ACTIVE | Noted: 2024-11-05

## 2024-11-05 PROCEDURE — 1160F RVW MEDS BY RX/DR IN RCRD: CPT | Mod: CPTII,,,

## 2024-11-05 PROCEDURE — 3008F BODY MASS INDEX DOCD: CPT | Mod: CPTII,,,

## 2024-11-05 PROCEDURE — 3078F DIAST BP <80 MM HG: CPT | Mod: CPTII,,,

## 2024-11-05 PROCEDURE — G2211 COMPLEX E/M VISIT ADD ON: HCPCS | Mod: ,,,

## 2024-11-05 PROCEDURE — 99213 OFFICE O/P EST LOW 20 MIN: CPT | Mod: ,,,

## 2024-11-05 PROCEDURE — 1101F PT FALLS ASSESS-DOCD LE1/YR: CPT | Mod: CPTII,,,

## 2024-11-05 PROCEDURE — 1125F AMNT PAIN NOTED PAIN PRSNT: CPT | Mod: CPTII,,,

## 2024-11-05 PROCEDURE — 1159F MED LIST DOCD IN RCRD: CPT | Mod: CPTII,,,

## 2024-11-05 PROCEDURE — 3075F SYST BP GE 130 - 139MM HG: CPT | Mod: CPTII,,,

## 2024-11-05 PROCEDURE — 3288F FALL RISK ASSESSMENT DOCD: CPT | Mod: CPTII,,,

## 2024-11-05 RX ORDER — HYDROCODONE BITARTRATE AND ACETAMINOPHEN 10; 325 MG/1; MG/1
1 TABLET ORAL EVERY 8 HOURS PRN
Qty: 90 TABLET | Refills: 0 | Status: SHIPPED | OUTPATIENT
Start: 2024-11-05 | End: 2024-12-05

## 2024-11-05 RX ORDER — PREDNISONE 20 MG/1
20 TABLET ORAL EVERY MORNING
COMMUNITY
Start: 2024-08-06

## 2024-11-05 RX ORDER — CYCLOBENZAPRINE HCL 5 MG
5 TABLET ORAL EVERY 8 HOURS PRN
COMMUNITY
Start: 2024-08-06

## 2024-11-05 NOTE — ASSESSMENT & PLAN NOTE
Patient declines pain management referral and physical therapy.   Stretching exercises as discussed.   Follow up appointment for chronic pain management scheduled.  Pain Contract signed.

## 2024-11-05 NOTE — ASSESSMENT & PLAN NOTE
Patient declines smoking cessation program. States he will quit on his own.   Smoking cessation discussed for 5 minutes.   Discussed benefits of quitting including improved health, decreased cardiac/vascular/pulmonary/stroke risks as well as saving money.

## 2024-11-05 NOTE — TELEPHONE ENCOUNTER
----- Message from MASSIMO Dunlap sent at 11/5/2024  2:45 PM CST -----  HYDROcodone-acetaminophen (NORCO)  mg per tablet   Take 1 tablet by mouth every 8 (eight) hours as needed for Pain    Dr. Valles recently refilled his rx on 10/29/2024-11/5/2024.

## 2024-11-05 NOTE — PROGRESS NOTES
"  Patient ID: [id number]     Chief Complaint: Chronic Pain and Depression     HPI:     Yogesh Soto is a 69 y.o. male here today for a follow up. Patient reports chronic neck, left shoulder, and low back for over 2 years. Chronic pain due to years of riding horses and injury after falling off of a horse. He rates his pain 10/10 on pain scale. He describes his pain as a sharp, throbbing, constant pain. Aggravating factors push/pulling, walking long distances, raising his arm. Alleviating factors pain medication and rest at times. He has severe DJD C-spine, DJD L-spine, and left rotator cuff tear. Surgery recommended but patient refuses surgery. Stated, "I understand the risk but I am not interested in getting cut on. " Discussed referral to pain management and/pr physical therapy. Patent declines referral to pain management and physical therapy. Patient is requesting a refill of his pain medication, Norco. Advised patient NP cannot refill chronic pain medication. He verbalized his understanding.     Patient reports depression for several months due to stressors in his home. Patient has Rx for duloxetine and states he never took the medication. Inquired about barriers to taking the mediation. Patient stated none. He denies SI/HI, AH/VH. Discussed the benefit of outpatient and medication therapy. Patient refused stating, "I don't need none of that. I just avoid the people causing me stress."     Patient is smoking. States he stopped smoking for approximately 2 months. He experienced a stressor and began smoking yesterday. States smoking helped him to "calm down."  Discussed additional assistance with the smoking cessation program. Patient declined stating he will stop on his own. Inquired about his plan to quit smoking. Patient stated, "I will just quit on my own."    Patient needs colon cancer screening. States he has Cologuard kit at home. Patient encouraged to complete the kit soon. Patient stated "I will do it " "tomorrow."        Past Medical History:   Diagnosis Date    Cervical radiculopathy     Insomnia     Osteopenia     PVD (peripheral vascular disease)         Past Surgical History:   Procedure Laterality Date    CEREBRAL ANGIOGRAM          Social History     Tobacco Use    Smoking status: Some Days     Current packs/day: 0.00     Types: Cigarettes     Last attempt to quit:      Years since quittin.8    Smokeless tobacco: Never    Tobacco comments:     3 cigarettes a week   Substance and Sexual Activity    Alcohol use: Yes     Alcohol/week: 1.0 standard drink of alcohol     Types: 1 Cans of beer per week     Comment: 1 a week    Drug use: Never    Sexual activity: Not Currently        Current Outpatient Medications   Medication Instructions    aspirin (ECOTRIN) 81 mg, Daily    cholecalciferol, vitamin D3, (VITAMIN D3) 50 mcg (2,000 unit) Tab Daily    clopidogreL (PLAVIX) 75 mg, Daily    COMBIVENT RESPIMAT  mcg/actuation inhaler SMARTSI Spray(s) By Mouth 3 Times Daily    cyclobenzaprine (FLEXERIL) 5 mg, Every 8 hours PRN    DULoxetine (CYMBALTA) 30 mg, Oral, Daily    ferrous gluconate (FERGON) 324 mg, With breakfast    HYDROcodone-acetaminophen (NORCO)  mg per tablet 1 tablet, Oral, Every 8 hours PRN     mg, Every 8 hours PRN    predniSONE (DELTASONE) 20 mg, Every morning    promethazine-dextromethorphan (PROMETHAZINE-DM) 6.25-15 mg/5 mL Syrp 5 mLs, Oral, Every 6 hours PRN       Review of patient's allergies indicates:  No Known Allergies     Patient Care Team:  Rosalinda Montoya MD as PCP - General (Family Medicine)  Jasmin Fishman NP (Nurse Practitioner)  Mariaelena Saleh NP as Nurse Practitioner (Nurse Practitioner)  Skyla Fernandez LPN as Care Coordinator     Subjective:     Review of Systems   Constitutional:  Negative for chills, fatigue and fever.   HENT:  Negative for congestion, postnasal drip and sore throat.    Eyes:  Negative for visual disturbance. " "  Respiratory:  Negative for cough, shortness of breath and wheezing.    Cardiovascular:  Negative for chest pain, palpitations and leg swelling.   Gastrointestinal:  Negative for abdominal pain, blood in stool, constipation, diarrhea, nausea and vomiting.   Endocrine: Negative.    Genitourinary:  Negative for dysuria, frequency and hematuria.   Musculoskeletal:  Positive for neck pain. Negative for arthralgias.        Per HPI   Skin:  Negative for rash.   Neurological:  Negative for dizziness, weakness, light-headedness and headaches.   Psychiatric/Behavioral:  Negative for self-injury, sleep disturbance and suicidal ideas.         Reports depression.       12 point review of systems conducted, negative except as stated in the history of present illness. See HPI for details.    Objective:     Visit Vitals  /65 (BP Location: Left arm, Patient Position: Sitting)   Pulse 81   Temp 98.7 °F (37.1 °C) (Oral)   Resp 18   Ht 5' 8" (1.727 m)   Wt 53.5 kg (117 lb 15.1 oz)   SpO2 99%   BMI 17.93 kg/m²       Physical Exam  Vitals reviewed.   Constitutional:       General: He is not in acute distress.     Appearance: Normal appearance.   HENT:      Right Ear: Tympanic membrane normal.      Left Ear: Tympanic membrane normal.      Nose: No congestion or rhinorrhea.      Mouth/Throat:      Mouth: Mucous membranes are moist.      Pharynx: Oropharynx is clear.   Eyes:      Conjunctiva/sclera: Conjunctivae normal.      Pupils: Pupils are equal, round, and reactive to light.   Neck:      Vascular: No carotid bruit.   Cardiovascular:      Rate and Rhythm: Normal rate and regular rhythm.      Pulses: Normal pulses.      Heart sounds: Normal heart sounds.   Abdominal:      General: Bowel sounds are normal.      Palpations: Abdomen is soft.   Musculoskeletal:         General: Normal range of motion.      Cervical back: Normal range of motion. No rigidity.      Comments: Cervical spine and lumbar spine tenderness. Limited ROM " right/left shoulder.    Lymphadenopathy:      Cervical: No cervical adenopathy.   Skin:     General: Skin is warm and dry.      Capillary Refill: Capillary refill takes less than 2 seconds.   Neurological:      General: No focal deficit present.      Mental Status: He is alert.   Psychiatric:         Mood and Affect: Mood normal.         Assessment:       ICD-10-CM ICD-9-CM   1. Cervical radiculopathy, chronic  M54.12 723.4   2. Chronic radicular lumbar pain  M54.16 724.4    G89.29 338.29   3. Chronic pain of both shoulders  M25.511 719.41    G89.29 338.29    M25.512    4. Depression, unspecified depression type  F32.A 311   5. Tobacco use  Z72.0 305.1        Plan:     1. Cervical radiculopathy, chronic  Assessment & Plan:  Patient declines pain management referral and physical therapy.   Stretching exercises as discussed.   Follow up appointment for chronic pain management scheduled.  Pain Contract signed.      2. Chronic radicular lumbar pain  Assessment & Plan:  Same as #1.      3. Chronic pain of both shoulders  Assessment & Plan:  Same as #1.      4. Depression, unspecified depression type  Assessment & Plan:  Patient refuses outpatient therapy and medication management.  Read positive daily meditations, avoid negative media, set healthy boundaries.  Exercise as tolerated daily and keep a consistent sleep pattern.  Establish good social support, make changes to reduce stress.  Do not drink alcohol or use illicit drugs.  Reports any symptoms of suicidal/homicidal ideations or self harm immediately, go to nearest emergency room.      5. Tobacco use  Assessment & Plan:  Patient declines smoking cessation program. States he will quit on his own.   Smoking cessation discussed for 5 minutes.   Discussed benefits of quitting including improved health, decreased cardiac/vascular/pulmonary/stroke risks as well as saving money.            Follow up in 3 months for Chronic Pain. In addition to their scheduled follow up,  the patient has also been instructed to follow up on as needed basis.     Future Appointments   Date Time Provider Department Center   2/13/2025  1:30 PM Rosalinda Montoya MD [facility] [place]

## 2024-11-05 NOTE — ASSESSMENT & PLAN NOTE
Patient refuses outpatient therapy and medication management.  Read positive daily meditations, avoid negative media, set healthy boundaries.  Exercise as tolerated daily and keep a consistent sleep pattern.  Establish good social support, make changes to reduce stress.  Do not drink alcohol or use illicit drugs.  Reports any symptoms of suicidal/homicidal ideations or self harm immediately, go to nearest emergency room.

## 2024-11-25 ENCOUNTER — PATIENT MESSAGE (OUTPATIENT)
Dept: FAMILY MEDICINE | Facility: CLINIC | Age: 69
End: 2024-11-25
Payer: MEDICARE

## 2024-11-25 DIAGNOSIS — J44.9 CHRONIC OBSTRUCTIVE PULMONARY DISEASE, UNSPECIFIED COPD TYPE: ICD-10-CM

## 2024-11-25 RX ORDER — PROMETHAZINE HYDROCHLORIDE AND DEXTROMETHORPHAN HYDROBROMIDE 6.25; 15 MG/5ML; MG/5ML
5 SYRUP ORAL EVERY 6 HOURS PRN
Qty: 180 ML | Refills: 0 | Status: SHIPPED | OUTPATIENT
Start: 2024-11-25

## 2024-11-25 NOTE — TELEPHONE ENCOUNTER
----- Message from Deana sent at 11/25/2024 10:42 AM CST -----  Who Called: Yogesh Soto    Refill or New Rx:Refill  RX Name and Strength:promethazine-dextromethorphan (PROMETHAZINE-DM) 6.25-15 mg/5 mL Syrp  How is the patient currently taking it? (ex. 1XDay):  Is this a 30 day or 90 day RX:  Local or Mail Order:  List of preferred pharmacies on file (remove unneeded): [unfilled]  If different Pharmacy is requested, enter Pharmacy information here including location and phone number: [facility] [place]   Ordering Provider:    Patient's Preferred Phone Number on File: [phone]   Best Call Back Number, if different:  Additional Information:

## 2024-12-02 DIAGNOSIS — M25.551 RIGHT HIP PAIN: ICD-10-CM

## 2024-12-02 DIAGNOSIS — M47.22 CERVICAL RADICULOPATHY DUE TO DEGENERATIVE JOINT DISEASE OF SPINE: ICD-10-CM

## 2024-12-02 DIAGNOSIS — M54.16 LUMBAR RADICULOPATHY, CHRONIC: ICD-10-CM

## 2024-12-02 DIAGNOSIS — J44.9 CHRONIC OBSTRUCTIVE PULMONARY DISEASE, UNSPECIFIED COPD TYPE: ICD-10-CM

## 2024-12-02 RX ORDER — PROMETHAZINE HYDROCHLORIDE AND DEXTROMETHORPHAN HYDROBROMIDE 6.25; 15 MG/5ML; MG/5ML
5 SYRUP ORAL EVERY 6 HOURS PRN
Qty: 180 ML | Refills: 0 | Status: SHIPPED | OUTPATIENT
Start: 2024-12-02

## 2024-12-02 RX ORDER — HYDROCODONE BITARTRATE AND ACETAMINOPHEN 10; 325 MG/1; MG/1
1 TABLET ORAL EVERY 8 HOURS PRN
Qty: 90 TABLET | Refills: 0 | Status: SHIPPED | OUTPATIENT
Start: 2024-12-02 | End: 2025-01-01

## 2024-12-02 NOTE — TELEPHONE ENCOUNTER
----- Message from Marybeth sent at 12/2/2024  9:36 AM CST -----  Who Called: Yogesh Soto    Refill or New Rx:Refill  RX Name and Strength:HYDROcodone-acetaminophen (NORCO)  mg per tablet  How is the patient currently taking it? (ex. 1XDay): Take 1 tablet by mouth every 8 (eight) hours as needed for Pain. - Oral  Is this a 30 day or 90 day RX:90  Local or Mail Order:local   List of preferred pharmacies on file (remove unneeded): [address] [[id number]]    Ordering Provider:Rosalinda Montoya MD      Preferred Method of Contact: Phone Call  Patient's Preferred Phone Number on File: [phone]   Best Call Back Number, if different:  Additional Information:        Who Called: Yogesh Soto    Caller is requesting assistance/information from provider's office.    Symptoms (please be specific):    How long has patient had these symptoms:    [facility] [id number]      Preferred Method of Contact: Phone Call  Patient's Preferred Phone Number on File: [phone]   Best Call Back Number, if different:  Additional Information: Pt would like promethazine-dextromethorphan (PROMETHAZINE-DM) 6.25-15 mg/5 mL Syrp transferred to [facility]. Please advise.

## 2024-12-30 DIAGNOSIS — M25.551 RIGHT HIP PAIN: ICD-10-CM

## 2024-12-30 DIAGNOSIS — M47.22 CERVICAL RADICULOPATHY DUE TO DEGENERATIVE JOINT DISEASE OF SPINE: ICD-10-CM

## 2024-12-30 DIAGNOSIS — M54.16 LUMBAR RADICULOPATHY, CHRONIC: ICD-10-CM

## 2025-01-02 DIAGNOSIS — J44.9 CHRONIC OBSTRUCTIVE PULMONARY DISEASE, UNSPECIFIED COPD TYPE: ICD-10-CM

## 2025-01-02 DIAGNOSIS — M47.22 CERVICAL RADICULOPATHY DUE TO DEGENERATIVE JOINT DISEASE OF SPINE: ICD-10-CM

## 2025-01-02 DIAGNOSIS — M54.16 LUMBAR RADICULOPATHY, CHRONIC: ICD-10-CM

## 2025-01-02 DIAGNOSIS — M25.551 RIGHT HIP PAIN: ICD-10-CM

## 2025-01-02 RX ORDER — HYDROCODONE BITARTRATE AND ACETAMINOPHEN 10; 325 MG/1; MG/1
1 TABLET ORAL EVERY 8 HOURS PRN
Qty: 90 TABLET | Refills: 0 | OUTPATIENT
Start: 2025-01-02 | End: 2025-02-01

## 2025-01-02 RX ORDER — PROMETHAZINE HYDROCHLORIDE AND DEXTROMETHORPHAN HYDROBROMIDE 6.25; 15 MG/5ML; MG/5ML
5 SYRUP ORAL EVERY 6 HOURS PRN
Qty: 180 ML | Refills: 0 | Status: SHIPPED | OUTPATIENT
Start: 2025-01-02

## 2025-01-02 RX ORDER — HYDROCODONE BITARTRATE AND ACETAMINOPHEN 10; 325 MG/1; MG/1
1 TABLET ORAL EVERY 8 HOURS PRN
Qty: 90 TABLET | Refills: 0 | Status: SHIPPED | OUTPATIENT
Start: 2025-01-02 | End: 2025-02-01

## 2025-01-31 DIAGNOSIS — M54.16 LUMBAR RADICULOPATHY, CHRONIC: ICD-10-CM

## 2025-01-31 DIAGNOSIS — M25.551 RIGHT HIP PAIN: ICD-10-CM

## 2025-01-31 DIAGNOSIS — M47.22 CERVICAL RADICULOPATHY DUE TO DEGENERATIVE JOINT DISEASE OF SPINE: ICD-10-CM

## 2025-02-02 RX ORDER — HYDROCODONE BITARTRATE AND ACETAMINOPHEN 10; 325 MG/1; MG/1
1 TABLET ORAL EVERY 8 HOURS PRN
Qty: 90 TABLET | Refills: 0 | Status: SHIPPED | OUTPATIENT
Start: 2025-02-02 | End: 2025-03-04

## 2025-03-06 DIAGNOSIS — M47.22 CERVICAL RADICULOPATHY DUE TO DEGENERATIVE JOINT DISEASE OF SPINE: ICD-10-CM

## 2025-03-06 DIAGNOSIS — J44.9 CHRONIC OBSTRUCTIVE PULMONARY DISEASE, UNSPECIFIED COPD TYPE: ICD-10-CM

## 2025-03-06 DIAGNOSIS — M25.551 RIGHT HIP PAIN: ICD-10-CM

## 2025-03-06 DIAGNOSIS — M54.16 LUMBAR RADICULOPATHY, CHRONIC: ICD-10-CM

## 2025-03-06 RX ORDER — HYDROCODONE BITARTRATE AND ACETAMINOPHEN 10; 325 MG/1; MG/1
1 TABLET ORAL EVERY 8 HOURS PRN
Qty: 90 TABLET | Refills: 0 | Status: CANCELLED | OUTPATIENT
Start: 2025-03-06 | End: 2025-04-05

## 2025-03-06 RX ORDER — PROMETHAZINE HYDROCHLORIDE AND DEXTROMETHORPHAN HYDROBROMIDE 6.25; 15 MG/5ML; MG/5ML
5 SYRUP ORAL EVERY 6 HOURS PRN
Qty: 180 ML | Refills: 0 | Status: SHIPPED | OUTPATIENT
Start: 2025-03-06

## 2025-03-11 ENCOUNTER — TELEPHONE (OUTPATIENT)
Dept: FAMILY MEDICINE | Facility: CLINIC | Age: 70
End: 2025-03-11
Payer: MEDICARE

## 2025-03-11 DIAGNOSIS — M25.551 RIGHT HIP PAIN: ICD-10-CM

## 2025-03-11 DIAGNOSIS — M47.22 CERVICAL RADICULOPATHY DUE TO DEGENERATIVE JOINT DISEASE OF SPINE: ICD-10-CM

## 2025-03-11 DIAGNOSIS — M54.16 LUMBAR RADICULOPATHY, CHRONIC: Primary | ICD-10-CM

## 2025-03-11 RX ORDER — HYDROCODONE BITARTRATE AND ACETAMINOPHEN 10; 325 MG/1; MG/1
1 TABLET ORAL EVERY 8 HOURS PRN
Qty: 21 TABLET | Refills: 0 | Status: SHIPPED | OUTPATIENT
Start: 2025-03-11 | End: 2025-03-13 | Stop reason: SDUPTHER

## 2025-03-11 NOTE — TELEPHONE ENCOUNTER
Patient came into the office today asking to fill his pain medication.  Patient missed his follow up appt. 02/13/25 (states he was given the wrong day and time).  He is scheduled to see you on Thursday 02/13/25 but states he is in a lot of pain and wants to know if you could send enough pain medication until then. He does not have a phone to do an e visit.  Please advise.

## 2025-03-13 ENCOUNTER — OFFICE VISIT (OUTPATIENT)
Dept: FAMILY MEDICINE | Facility: CLINIC | Age: 70
End: 2025-03-13
Payer: MEDICARE

## 2025-03-13 VITALS
RESPIRATION RATE: 18 BRPM | WEIGHT: 124 LBS | SYSTOLIC BLOOD PRESSURE: 135 MMHG | HEART RATE: 92 BPM | TEMPERATURE: 99 F | HEIGHT: 68 IN | DIASTOLIC BLOOD PRESSURE: 80 MMHG | OXYGEN SATURATION: 99 % | BODY MASS INDEX: 18.79 KG/M2

## 2025-03-13 DIAGNOSIS — M79.645 PAIN OF LEFT THUMB: ICD-10-CM

## 2025-03-13 DIAGNOSIS — M47.22 CERVICAL RADICULOPATHY DUE TO DEGENERATIVE JOINT DISEASE OF SPINE: Primary | ICD-10-CM

## 2025-03-13 DIAGNOSIS — F11.20 OPIOID DEPENDENCE, UNCOMPLICATED: ICD-10-CM

## 2025-03-13 DIAGNOSIS — J44.9 CHRONIC OBSTRUCTIVE PULMONARY DISEASE, UNSPECIFIED COPD TYPE: ICD-10-CM

## 2025-03-13 DIAGNOSIS — M25.551 RIGHT HIP PAIN: ICD-10-CM

## 2025-03-13 DIAGNOSIS — M54.16 LUMBAR RADICULOPATHY, CHRONIC: ICD-10-CM

## 2025-03-13 RX ORDER — METHYLPREDNISOLONE 4 MG/1
TABLET ORAL
Qty: 21 EACH | Refills: 0 | Status: SHIPPED | OUTPATIENT
Start: 2025-03-13 | End: 2025-04-03

## 2025-03-13 RX ORDER — HYDROCODONE BITARTRATE AND ACETAMINOPHEN 10; 325 MG/1; MG/1
1 TABLET ORAL EVERY 8 HOURS PRN
Qty: 90 TABLET | Refills: 0 | Status: SHIPPED | OUTPATIENT
Start: 2025-03-17 | End: 2025-04-16

## 2025-03-13 RX ORDER — CLINDAMYCIN HYDROCHLORIDE 300 MG/1
300 CAPSULE ORAL EVERY 8 HOURS
Qty: 30 CAPSULE | Refills: 0 | Status: SHIPPED | OUTPATIENT
Start: 2025-03-13 | End: 2025-03-23

## 2025-03-13 NOTE — PROGRESS NOTES
Patient ID: [id number]     Chief Complaint: Follow-up (Chronic pain, med refill, finger injury)        HPI:     Yogesh Soto is a 70 y.o. male here today for a follow up.  - Patient complains of chronic neck, left shoulder, and low back and opioid dependence x > 2 years. He reports that he rode horses back in the day and a horse fell on him in the past. 9/10 on pain scale, constant, worse with activity, pain is sharp and throbbing, worse with standing and raising his arm as well, he is taking Norco with improvement of pain, no side effects, he tried to get in with pain management without success, he needs a refill of [place] today. He has severe DJD C-spine, DJD L-spine, and left rotator cuff tear, but he refuses surgery, risks of refusal discussed with patient and patient voices understanding. He has tried PT in the past without resolution of symptoms. He does not drive and would like home health.   - COPD is stable and asymptomatic with treatment, no side effects.   - He complains of left thumb pain and swelling x 3 days. He is not sure what happened, started as a small blister and spread, he reports that the pain is 10/10 on pain scale, constant, he hasn't had any imaging or workup.   - Patient is without any other complaints today.         -------------------------------------    Cervical radiculopathy    Insomnia    Osteopenia    PVD (peripheral vascular disease)        Past Surgical History:   Procedure Laterality Date    CEREBRAL ANGIOGRAM         Review of patient's allergies indicates:  No Known Allergies    Outpatient Medications Marked as Taking for the 3/13/25 encounter (Office Visit) with Rosalinda Montoya MD   Medication Sig Dispense Refill    aspirin (ECOTRIN) 81 MG EC tablet Take 81 mg by mouth once daily.      cholecalciferol, vitamin D3, (VITAMIN D3) 50 mcg (2,000 unit) Tab Take by mouth once daily.      COMBIVENT RESPIMAT  mcg/actuation inhaler SMARTSI Spray(s) By Mouth 3 Times  "Daily      cyclobenzaprine (FLEXERIL) 5 MG tablet Take 5 mg by mouth every 8 (eight) hours as needed.      ferrous gluconate (FERGON) 324 MG tablet Take 324 mg by mouth daily with breakfast.       mg tablet Take 600 mg by mouth every 8 (eight) hours as needed.      promethazine-dextromethorphan (PROMETHAZINE-DM) 6.25-15 mg/5 mL Syrp Take 5 mLs by mouth every 6 (six) hours as needed (cough). 180 mL 0    [DISCONTINUED] HYDROcodone-acetaminophen (NORCO)  mg per tablet Take 1 tablet by mouth every 8 (eight) hours as needed for Pain. 21 tablet 0       Social History[1]     Family History   Problem Relation Name Age of Onset    Hypertension Mother      Hypertension Brother          Subjective:       Review of Systems:    See HPI for details    Constitutional: No fever, No chills, No fatigue.   Eye: No blurring, No visual disturbances.   Ear/Nose/Mouth/Throat: No decreased hearing, No ear pain, No nasal congestion, No sore throat.   Respiratory: No shortness of breath, No cough, No wheezing.   Cardiovascular: No chest pain, No palpitations, No peripheral edema.   Gastrointestinal: No nausea, No vomiting, No diarrhea, No constipation, No abdominal pain.   Genitourinary: No dysuria, No hematuria.   Hematology/Lymphatics: No bruising tendency, No bleeding tendency, No swollen lymph glands.   Endocrine: No excessive thirst, No polyuria, No excessive hunger.   Musculoskeletal: As per HPI; No muscle pain, No decreased range of motion.   Integumentary: No rash, No pruritus.   Neurologic: As per HPI; No abnormal balance, No confusion, No headache.   Psychiatric: No anxiety, Reports depression, Not suicidal, No hallucinations.     All Other ROS: Negative except as stated in HPI.       Objective:     /80 (BP Location: Right arm, Patient Position: Sitting)   Pulse 92   Temp 99.2 °F (37.3 °C) (Oral)   Resp 18   Ht 5' 8" (1.727 m)   Wt 56.2 kg (124 lb)   SpO2 99%   BMI 18.85 kg/m²     Physical Exam    General: " Alert and oriented, No acute distress.   Eye: Pupils are equal, round and reactive to light, Normal conjunctiva.   HENT: Normocephalic, Tympanic membranes are clear, Normal hearing, Oral mucosa is moist, No pharyngeal erythema.   Throat: Pharynx ( Not edematous, No exudate ).   Neck: Supple, Non-tender, No carotid bruit, No lymphadenopathy, No thyromegaly.   Respiratory: Lungs are clear to auscultation, Respirations are non-labored, Breath sounds are equal, Symmetrical chest wall expansion, No chest wall tenderness.   Cardiovascular: Normal rate, Regular rhythm, No murmur, Good pulses equal in all extremities, No edema.   Gastrointestinal: Soft, Non-tender, Non-distended, Normal bowel sounds, No organomegaly.   Genitourinary: No costovertebral angle tenderness.   Musculoskeletal: Normal gait. Bilateral cervical spine with mild posterior TTP, no erythema, no effusion, no deformity, bilateral trapezius muscle spasms. Left shoulder with decreased abduction due to pain, no erythema, no effusion, no deformity, no crepitus. Bilateral lumbar with mild posterior TTP, no effusion, no deformity, bilateral paraspinal muscle spasms, bilateral straight leg raise. Left thumb with large blister, no erythema, decreased flexion due to blister, no purulent drainage, no fluctuance.   Neurologic: No focal deficits, Cranial Nerves II-XII are grossly intact.   Psychiatric: Cooperative, Appropriate mood & affect, Normal judgment, Non-suicidal.   Mood and affect: Calm.   Behavior: Relaxed.         Assessment:       ICD-10-CM ICD-9-CM   1. Cervical radiculopathy due to degenerative joint disease of spine  M47.22 721.0   2. Lumbar radiculopathy, chronic  M54.16 724.4   3. Right hip pain  M25.551 719.45   4. Opioid dependence, uncomplicated  F11.20 304.00   5. Chronic obstructive pulmonary disease, unspecified COPD type  J44.9 496   6. Pain of left thumb  M79.645 729.5        Plan:     Problem List Items Addressed This Visit           Psychiatric    Opioid dependence, uncomplicated       Pulmonary    Chronic obstructive pulmonary disease, unspecified COPD type     Other Visit Diagnoses         Cervical radiculopathy due to degenerative joint disease of spine    -  Primary    Relevant Medications    HYDROcodone-acetaminophen (NORCO)  mg per tablet (Start on 3/17/2025)    Other Relevant Orders    Ambulatory referral/consult to Home Health      Lumbar radiculopathy, chronic        Relevant Medications    HYDROcodone-acetaminophen (NORCO)  mg per tablet (Start on 3/17/2025)    Other Relevant Orders    Ambulatory referral/consult to Home Health      Right hip pain        Relevant Medications    HYDROcodone-acetaminophen (NORCO)  mg per tablet (Start on 3/17/2025)      Pain of left thumb        Relevant Medications    methylPREDNISolone (MEDROL DOSEPACK) 4 mg tablet    clindamycin (CLEOCIN) 300 MG capsule    Other Relevant Orders    X-Ray Hand Complete Left    Sedimentation rate    C-Reactive Protein    Uric Acid    CBC Auto Differential    Comprehensive Metabolic Panel    Ambulatory referral/consult to Home Health    Ambulatory referral/consult to Orthopedics         1. Cervical radiculopathy due to degenerative joint disease of spine  - HYDROcodone-acetaminophen (NORCO)  mg per tablet; Take 1 tablet by mouth every 8 (eight) hours as needed for Pain.  Dispense: 90 tablet; Refill: 0  - Ambulatory referral/consult to Home Health; Future for PT/OT  - Rx Norco 10 mg po x q8hrs p.r.n. refilled today with close monitoring.  reviewed today. Stretching exercises encouraged. Notify M.D. or ER if symptoms persist or worsen, SI/HI, temp greater than 100.4, or any acute illness.      2. Lumbar radiculopathy, chronic  - HYDROcodone-acetaminophen (NORCO)  mg per tablet; Take 1 tablet by mouth every 8 (eight) hours as needed for Pain.  Dispense: 90 tablet; Refill: 0  - Ambulatory referral/consult to Home Health; Future  - Rx [place] 10  mg po x q8hrs p.r.n. refilled today with close monitoring.  reviewed today. Stretching exercises encouraged. Notify M.D. or ER if symptoms persist or worsen, SI/HI, temp greater than 100.4, or any acute illness.      3. Right hip pain  - HYDROcodone-acetaminophen (NORCO)  mg per tablet; Take 1 tablet by mouth every 8 (eight) hours as needed for Pain.  Dispense: 90 tablet; Refill: 0  - Rx Norco 10 mg po x q8hrs p.r.n. refilled today with close monitoring.  reviewed today. Stretching exercises encouraged. Notify M.D. or ER if symptoms persist or worsen, SI/HI, temp greater than 100.4, or any acute illness.      4. Opioid dependence, uncomplicated  - Rx Norco 10 mg po x q8hrs p.r.n. refilled today with close monitoring.  reviewed today. Stretching exercises encouraged. Notify M.D. or ER if symptoms persist or worsen, SI/HI, temp greater than 100.4, or any acute illness.      5. Chronic obstructive pulmonary disease, unspecified COPD type  - Asymptomatic, continue Combivent, continue to monitor. Notify M.D. or ER if symptoms persist or worsen, SOB, wheezing, hemoptysis, temp >100.4, or any acute illness.      6. Pain of left thumb, blister versus possible early infection  - X-Ray Hand Complete Left; Future  - Sedimentation rate; Future  - C-Reactive Protein; Future  - Uric Acid; Future  - CBC Auto Differential; Future  - Comprehensive Metabolic Panel; Future  - Rx trial of methylPREDNISolone (MEDROL DOSEPACK) 4 mg tablet; use as directed  Dispense: 21 each; Refill: 0  - Rx trial of clindamycin (CLEOCIN) 300 MG capsule; Take 1 capsule (300 mg total) by mouth every 8 (eight) hours. for 10 days  Dispense: 30 capsule; Refill: 0  - Ambulatory referral/consult to Home Health; Future for PT/OT  - Ambulatory referral/consult to Orthopedics; Future for evaluation and treatment  - Will treat pending results. Notify M.D. or ER if symptoms persist or worsen, erythema, pain, bleeding, purulent drainage, temp >100.4,  or any acute illness.          Yogesh was seen today for follow-up.    Diagnoses and all orders for this visit:    Cervical radiculopathy due to degenerative joint disease of spine  -     HYDROcodone-acetaminophen (NORCO)  mg per tablet; Take 1 tablet by mouth every 8 (eight) hours as needed for Pain.  -     Ambulatory referral/consult to Home Health; Future    Lumbar radiculopathy, chronic  -     HYDROcodone-acetaminophen (NORCO)  mg per tablet; Take 1 tablet by mouth every 8 (eight) hours as needed for Pain.  -     Ambulatory referral/consult to Home Health; Future    Right hip pain  -     HYDROcodone-acetaminophen (NORCO)  mg per tablet; Take 1 tablet by mouth every 8 (eight) hours as needed for Pain.    Opioid dependence, uncomplicated    Chronic obstructive pulmonary disease, unspecified COPD type    Pain of left thumb  -     X-Ray Hand Complete Left; Future  -     Sedimentation rate; Future  -     C-Reactive Protein; Future  -     Uric Acid; Future  -     CBC Auto Differential; Future  -     Comprehensive Metabolic Panel; Future  -     methylPREDNISolone (MEDROL DOSEPACK) 4 mg tablet; use as directed  -     clindamycin (CLEOCIN) 300 MG capsule; Take 1 capsule (300 mg total) by mouth every 8 (eight) hours. for 10 days  -     Ambulatory referral/consult to Home Health; Future  -     Ambulatory referral/consult to Orthopedics; Future          Medication List with Changes/Refills   New Medications    CLINDAMYCIN (CLEOCIN) 300 MG CAPSULE    Take 1 capsule (300 mg total) by mouth every 8 (eight) hours. for 10 days       Start Date: 3/13/2025 End Date: 3/23/2025    METHYLPREDNISOLONE (MEDROL DOSEPACK) 4 MG TABLET    use as directed       Start Date: 3/13/2025 End Date: 4/3/2025   Current Medications    ASPIRIN (ECOTRIN) 81 MG EC TABLET    Take 81 mg by mouth once daily.       Start Date: --        End Date: --    CHOLECALCIFEROL, VITAMIN D3, (VITAMIN D3) 50 MCG (2,000 UNIT) TAB    Take by mouth once  daily.       Start Date: --        End Date: --    CLOPIDOGREL (PLAVIX) 75 MG TABLET    Take 75 mg by mouth once daily.       Start Date: --        End Date: --    COMBIVENT RESPIMAT  MCG/ACTUATION INHALER    SMARTSI Spray(s) By Mouth 3 Times Daily       Start Date: 2023End Date: --    CYCLOBENZAPRINE (FLEXERIL) 5 MG TABLET    Take 5 mg by mouth every 8 (eight) hours as needed.       Start Date: 2024  End Date: --    DULOXETINE (CYMBALTA) 30 MG CAPSULE    Take 1 capsule (30 mg total) by mouth once daily.       Start Date: 2024 End Date: 2024    FERROUS GLUCONATE (FERGON) 324 MG TABLET    Take 324 mg by mouth daily with breakfast.       Start Date: --        End Date: --     MG TABLET    Take 600 mg by mouth every 8 (eight) hours as needed.       Start Date: 2023End Date: --    PROMETHAZINE-DEXTROMETHORPHAN (PROMETHAZINE-DM) 6.25-15 MG/5 ML SYRP    Take 5 mLs by mouth every 6 (six) hours as needed (cough).       Start Date: 3/6/2025  End Date: --   Changed and/or Refilled Medications    Modified Medication Previous Medication    HYDROCODONE-ACETAMINOPHEN (NORCO)  MG PER TABLET HYDROcodone-acetaminophen (NORCO)  mg per tablet       Take 1 tablet by mouth every 8 (eight) hours as needed for Pain.    Take 1 tablet by mouth every 8 (eight) hours as needed for Pain.       Start Date: 3/17/2025 End Date: 2025    Start Date: 3/11/2025 End Date: 3/13/2025   Discontinued Medications    PREDNISONE (DELTASONE) 20 MG TABLET    Take 20 mg by mouth every morning.       Start Date: 2024  End Date: 3/13/2025          Follow up in about 3 months (around 6/10/2025), or As scheduled or sooner if symptoms worsen or fail to improve.          [1]   Social History  Socioeconomic History    Marital status: Single   Tobacco Use    Smoking status: Some Days     Current packs/day: 0.00     Types: Cigarettes     Last attempt to quit:      Years since quittin.1    Smokeless  tobacco: Never    Tobacco comments:     3 cigarettes a week   Substance and Sexual Activity    Alcohol use: Yes     Alcohol/week: 1.0 standard drink of alcohol     Types: 1 Cans of beer per week     Comment: 1 a week    Drug use: Never    Sexual activity: Not Currently   Social History Narrative    ** Merged History Encounter **          Social Drivers of Health     Financial Resource Strain: Low Risk  (2/27/2024)    Overall Financial Resource Strain (CARDIA)     Difficulty of Paying Living Expenses: Not hard at all   Food Insecurity: No Food Insecurity (2/27/2024)    Hunger Vital Sign     Worried About Running Out of Food in the Last Year: Never true     Ran Out of Food in the Last Year: Never true   Transportation Needs: No Transportation Needs (2/27/2024)    PRAPARE - Transportation     Lack of Transportation (Medical): No     Lack of Transportation (Non-Medical): No   Physical Activity: Inactive (2/27/2024)    Exercise Vital Sign     Days of Exercise per Week: 0 days     Minutes of Exercise per Session: 10 min   Stress: No Stress Concern Present (2/27/2024)    [employer] [place] [employer] - Occupational Stress Questionnaire     Feeling of Stress : Not at all   Housing Stability: Low Risk  (2/27/2024)    Housing Stability Vital Sign     Unable to Pay for Housing in the Last Year: No     Number of Places Lived in the Last Year: 1     Unstable Housing in the Last Year: No

## 2025-03-14 ENCOUNTER — TELEPHONE (OUTPATIENT)
Dept: FAMILY MEDICINE | Facility: CLINIC | Age: 70
End: 2025-03-14
Payer: MEDICARE

## 2025-03-14 NOTE — TELEPHONE ENCOUNTER
----- Message from Sunil sent at 3/14/2025  9:44 AM CDT -----  .Who Called: Ekaterina, [facility]ties Caller is requesting assistance/information from provider's office.Symptoms (please be specific): n/a How long has patient had these symptoms:  n/aList of preferred pharmacies on file (remove unneeded): [unfilled]If different, enter pharmacy into here including location and phone number: n/aPreferred Method of Contact: Phone CallPatient's Preferred Phone Number on File: [phone] Best Call Back Number, if different:[phone] Information: calling in regards to a partial fax

## 2025-03-14 NOTE — TELEPHONE ENCOUNTER
Ekaterina from Home Health wants to know if the patient is being referred because of his thumb. She voiced if the patient is not home bound that they can only do an evaluation. Please advise.

## 2025-03-17 ENCOUNTER — TELEPHONE (OUTPATIENT)
Dept: FAMILY MEDICINE | Facility: CLINIC | Age: 70
End: 2025-03-17
Payer: MEDICARE

## 2025-03-17 ENCOUNTER — DOCUMENTATION ONLY (OUTPATIENT)
Dept: FAMILY MEDICINE | Facility: CLINIC | Age: 70
End: 2025-03-17
Payer: MEDICARE

## 2025-03-17 NOTE — TELEPHONE ENCOUNTER
I notified Ekaterina from nursing specialties and she voiced to me that the pt cannot be seen by them if the pt is not home bound. She voiced that the pt will need to see a wound clinic and physical therapy.

## 2025-03-20 ENCOUNTER — PATIENT MESSAGE (OUTPATIENT)
Dept: FAMILY MEDICINE | Facility: CLINIC | Age: 70
End: 2025-03-20
Payer: MEDICARE

## 2025-03-24 ENCOUNTER — DOCUMENTATION ONLY (OUTPATIENT)
Dept: FAMILY MEDICINE | Facility: CLINIC | Age: 70
End: 2025-03-24
Payer: MEDICARE

## 2025-04-14 DIAGNOSIS — M25.551 RIGHT HIP PAIN: ICD-10-CM

## 2025-04-14 DIAGNOSIS — M47.22 CERVICAL RADICULOPATHY DUE TO DEGENERATIVE JOINT DISEASE OF SPINE: ICD-10-CM

## 2025-04-14 DIAGNOSIS — M54.16 LUMBAR RADICULOPATHY, CHRONIC: ICD-10-CM

## 2025-04-14 RX ORDER — HYDROCODONE BITARTRATE AND ACETAMINOPHEN 10; 325 MG/1; MG/1
1 TABLET ORAL EVERY 8 HOURS PRN
Qty: 90 TABLET | Refills: 0 | Status: SHIPPED | OUTPATIENT
Start: 2025-04-14 | End: 2025-05-14

## 2025-05-03 NOTE — TELEPHONE ENCOUNTER
----- Message from Yana Diego sent at 4/6/2023  9:41 AM CDT -----  Regarding: pharmacy call  Type:  Pharmacy Calling to Clarify an RX    Name of Caller:donnie  Pharmacy Name:[facility] pharmacy  Prescription Name:HYDROcodone-acetaminophen (NORCO) 7.5-325 mg per tablet  What do they need to clarify?:pt called pharmacy and was told script had been filled, pharmacy states they never received   Best Call Back Number:[phone]  Additional Information: pt is upset with pharmacy looking for his meds but they do not have a script please process as soon as possible         Left message to call back

## 2025-05-05 ENCOUNTER — PATIENT OUTREACH (OUTPATIENT)
Facility: CLINIC | Age: 70
End: 2025-05-05
Payer: MEDICARE

## 2025-05-09 DIAGNOSIS — M25.551 RIGHT HIP PAIN: ICD-10-CM

## 2025-05-09 DIAGNOSIS — M54.16 LUMBAR RADICULOPATHY, CHRONIC: ICD-10-CM

## 2025-05-09 DIAGNOSIS — M47.22 CERVICAL RADICULOPATHY DUE TO DEGENERATIVE JOINT DISEASE OF SPINE: ICD-10-CM

## 2025-05-12 DIAGNOSIS — M54.16 LUMBAR RADICULOPATHY, CHRONIC: ICD-10-CM

## 2025-05-12 DIAGNOSIS — M25.551 RIGHT HIP PAIN: ICD-10-CM

## 2025-05-12 DIAGNOSIS — J44.9 CHRONIC OBSTRUCTIVE PULMONARY DISEASE, UNSPECIFIED COPD TYPE: ICD-10-CM

## 2025-05-12 DIAGNOSIS — M47.22 CERVICAL RADICULOPATHY DUE TO DEGENERATIVE JOINT DISEASE OF SPINE: ICD-10-CM

## 2025-05-12 RX ORDER — HYDROCODONE BITARTRATE AND ACETAMINOPHEN 10; 325 MG/1; MG/1
1 TABLET ORAL EVERY 8 HOURS PRN
Qty: 90 TABLET | Refills: 0 | Status: SHIPPED | OUTPATIENT
Start: 2025-05-12 | End: 2025-05-12 | Stop reason: SDUPTHER

## 2025-05-12 RX ORDER — HYDROCODONE BITARTRATE AND ACETAMINOPHEN 10; 325 MG/1; MG/1
1 TABLET ORAL EVERY 8 HOURS PRN
Qty: 90 TABLET | Refills: 0 | Status: SHIPPED | OUTPATIENT
Start: 2025-05-12

## 2025-05-12 RX ORDER — PROMETHAZINE HYDROCHLORIDE AND DEXTROMETHORPHAN HYDROBROMIDE 6.25; 15 MG/5ML; MG/5ML
5 SYRUP ORAL EVERY 6 HOURS PRN
Qty: 180 ML | Refills: 0 | Status: SHIPPED | OUTPATIENT
Start: 2025-05-12

## 2025-06-10 ENCOUNTER — OFFICE VISIT (OUTPATIENT)
Dept: FAMILY MEDICINE | Facility: CLINIC | Age: 70
End: 2025-06-10
Payer: MEDICARE

## 2025-06-10 VITALS
DIASTOLIC BLOOD PRESSURE: 76 MMHG | SYSTOLIC BLOOD PRESSURE: 138 MMHG | HEIGHT: 68 IN | HEART RATE: 79 BPM | RESPIRATION RATE: 18 BRPM | TEMPERATURE: 98 F | OXYGEN SATURATION: 97 % | WEIGHT: 122 LBS | BODY MASS INDEX: 18.49 KG/M2

## 2025-06-10 DIAGNOSIS — M25.512 CHRONIC LEFT SHOULDER PAIN: ICD-10-CM

## 2025-06-10 DIAGNOSIS — M54.2 CERVICALGIA: ICD-10-CM

## 2025-06-10 DIAGNOSIS — F11.20 OPIOID DEPENDENCE, UNCOMPLICATED: ICD-10-CM

## 2025-06-10 DIAGNOSIS — S09.90XA HEAD TRAUMA, INITIAL ENCOUNTER: ICD-10-CM

## 2025-06-10 DIAGNOSIS — S09.90XS INJURY OF HEAD, SEQUELA: ICD-10-CM

## 2025-06-10 DIAGNOSIS — G89.29 CHRONIC LEFT SHOULDER PAIN: ICD-10-CM

## 2025-06-10 DIAGNOSIS — I73.9 PVD (PERIPHERAL VASCULAR DISEASE): ICD-10-CM

## 2025-06-10 DIAGNOSIS — M25.551 RIGHT HIP PAIN: ICD-10-CM

## 2025-06-10 DIAGNOSIS — M54.16 LUMBAR RADICULOPATHY, CHRONIC: ICD-10-CM

## 2025-06-10 DIAGNOSIS — M47.22 CERVICAL RADICULOPATHY DUE TO DEGENERATIVE JOINT DISEASE OF SPINE: Primary | ICD-10-CM

## 2025-06-10 DIAGNOSIS — Z12.11 SCREENING FOR COLON CANCER: ICD-10-CM

## 2025-06-10 RX ORDER — HYDROCODONE BITARTRATE AND ACETAMINOPHEN 10; 325 MG/1; MG/1
1 TABLET ORAL EVERY 8 HOURS PRN
Qty: 90 TABLET | Refills: 0 | Status: SHIPPED | OUTPATIENT
Start: 2025-06-12

## 2025-06-10 NOTE — PROGRESS NOTES
Patient ID: [id number]     Chief Complaint: Follow-up and Neck Pain        HPI:     Yogesh Soto is a 70 y.o. male here today for a follow up chronic neck pain, left shoulder pain, and low back pain.  - Patient complains of chronic neck, left shoulder, and low back and opioid dependence x > 2 years. He reports that he rode horses back in the day and a horse fell on him in the past. 9/10 on pain scale, constant, worse with activity, pain is sharp and throbbing, worse with standing and raising his arm as well, reports that his right leg has been giving out, he fell last week and this morning and hit his head on the pavement this morning, he denies nausea, vomiting, LOC, syncope, or visual changes, he didn't go to ER and hasn't had any imaging; he is taking Norco with improvement of pain, no side effects, he last Rx refill was 05/14/2025, he needs new Rx for 06/12/2025, he tried to get in with pain management without success, he needs a refill of [place] today. He has severe DJD C-spine, DJD L-spine, and left rotator cuff tear, but he refuses surgery, risks of refusal discussed with patient and patient voices understanding, he hasn't had imaging in over a year, would like imaging scheduled in [place]. He has tried PT in the past without resolution of symptoms.   - He reports history of PVD, stable, was seeing Cardiology, but needs new Referral to Cardiology in [place].   - He needs Cologuard ordered.   - He has wellness labs ordered in file from 06/2024, needs printed orders to have done at [facility] in [place].   - Patient is without any other complaints today.     -------------------------------------    Cervical radiculopathy    Insomnia    Osteopenia    PVD (peripheral vascular disease)        Past Surgical History:   Procedure Laterality Date    CEREBRAL ANGIOGRAM         Review of patient's allergies indicates:  No Known Allergies    Outpatient Medications Marked as Taking for the 6/10/25 encounter  (Office Visit) with Rosalinda Montoya MD   Medication Sig Dispense Refill    aspirin (ECOTRIN) 81 MG EC tablet Take 81 mg by mouth once daily.      cholecalciferol, vitamin D3, (VITAMIN D3) 50 mcg (2,000 unit) Tab Take by mouth once daily.      clopidogreL (PLAVIX) 75 mg tablet Take 75 mg by mouth once daily.      COMBIVENT RESPIMAT  mcg/actuation inhaler SMARTSI Spray(s) By Mouth 3 Times Daily      cyclobenzaprine (FLEXERIL) 5 MG tablet Take 5 mg by mouth every 8 (eight) hours as needed.      ferrous gluconate (FERGON) 324 MG tablet Take 324 mg by mouth daily with breakfast.       mg tablet Take 600 mg by mouth every 8 (eight) hours as needed.      promethazine-dextromethorphan (PROMETHAZINE-DM) 6.25-15 mg/5 mL Syrp Take 5 mLs by mouth every 6 (six) hours as needed (cough). 180 mL 0    [DISCONTINUED] HYDROcodone-acetaminophen (NORCO)  mg per tablet Take 1 tablet by mouth every 8 (eight) hours as needed for Pain. 90 tablet 0       Social History[1]     Family History   Problem Relation Name Age of Onset    Hypertension Mother      Hypertension Brother          Subjective:       Review of Systems:    See HPI for details    Constitutional: No fever, No chills, No fatigue.   Eye: No blurring, No visual disturbances.   Ear/Nose/Mouth/Throat: No decreased hearing, No ear pain, No nasal congestion, No sore throat.   Respiratory: No shortness of breath, No cough, No wheezing.   Cardiovascular: No chest pain, No palpitations, No peripheral edema.   Gastrointestinal: No nausea, No vomiting, No diarrhea, No constipation, No abdominal pain.   Genitourinary: No dysuria, No hematuria.   Hematology/Lymphatics: No bruising tendency, No bleeding tendency, No swollen lymph glands.   Endocrine: No excessive thirst, No polyuria, No excessive hunger.   Musculoskeletal: As per HPI; No muscle pain, No decreased range of motion.   Integumentary: No rash, No pruritus.   Neurologic: As per HPI; No abnormal  "balance, No confusion, No headache.   Psychiatric: No anxiety, Reports depression, Not suicidal, No hallucinations.     All Other ROS: Negative except as stated in HPI.       Objective:     /76 (BP Location: Right arm, Patient Position: Sitting)   Pulse 79   Temp 97.7 °F (36.5 °C) (Oral)   Resp 18   Ht 5' 8" (1.727 m)   Wt 55.3 kg (122 lb)   SpO2 97%   BMI 18.55 kg/m²     Physical Exam    General: Alert and oriented, No acute distress.   Eye: Pupils are equal, round and reactive to light, Normal conjunctiva.   HENT: Normocephalic, Tympanic membranes are clear, Normal hearing, Oral mucosa is moist, No pharyngeal erythema.   Throat: Pharynx ( Not edematous, No exudate ).   Neck: Supple, Non-tender, No carotid bruit, No lymphadenopathy, No thyromegaly.   Respiratory: Lungs are clear to auscultation, Respirations are non-labored, Breath sounds are equal, Symmetrical chest wall expansion, No chest wall tenderness.   Cardiovascular: Normal rate, Regular rhythm, No murmur, Good pulses equal in all extremities, No edema.   Gastrointestinal: Soft, Non-tender, Non-distended, Normal bowel sounds, No organomegaly.   Genitourinary: No costovertebral angle tenderness.   Musculoskeletal: Normal gait. Bilateral cervical spine with mild posterior TTP, no erythema, no effusion, no deformity, bilateral trapezius muscle spasms. Left shoulder with decreased abduction due to pain, no erythema, no effusion, no deformity, no crepitus. Bilateral lumbar with mild posterior TTP, no effusion, no deformity, bilateral paraspinal muscle spasms, bilateral straight leg raise. Left thumb with large blister, no erythema, decreased flexion due to blister, no purulent drainage, no fluctuance.   Neurologic: No focal deficits, Cranial Nerves II-XII are grossly intact.   Psychiatric: Cooperative, Appropriate mood & affect, Normal judgment, Non-suicidal.   Mood and affect: Calm.   Behavior: Relaxed.         Assessment:       ICD-10-CM ICD-9-CM "   1. Cervical radiculopathy due to degenerative joint disease of spine  M47.22 721.0   2. Lumbar radiculopathy, chronic  M54.16 724.4   3. Chronic left shoulder pain  M25.512 719.41    G89.29 338.29   4. Injury of head, sequela  S09.90XS 908.9   5. Opioid dependence, uncomplicated  F11.20 304.00   6. Screening for colon cancer  Z12.11 V76.51   7. PVD (peripheral vascular disease)  I73.9 443.9   8. Cervicalgia  M54.2 723.1   9. Head trauma, initial encounter  S09.90XA 959.01   10. Right hip pain  M25.551 719.45        Plan:     Problem List Items Addressed This Visit          Psychiatric    Opioid dependence, uncomplicated     Other Visit Diagnoses         Cervical radiculopathy due to degenerative joint disease of spine    -  Primary    Relevant Medications    HYDROcodone-acetaminophen (NORCO)  mg per tablet (Start on 6/12/2025)    Other Relevant Orders    MRI Cervical Spine Without Contrast      Lumbar radiculopathy, chronic        Relevant Medications    HYDROcodone-acetaminophen (NORCO)  mg per tablet (Start on 6/12/2025)    Other Relevant Orders    MRI Lumbar Spine Without Contrast      Chronic left shoulder pain        Relevant Medications    HYDROcodone-acetaminophen (NORCO)  mg per tablet (Start on 6/12/2025)    Other Relevant Orders    MRI Shoulder Without Contrast Left      Injury of head, sequela        Relevant Orders    CT Head Without Contrast      Screening for colon cancer        Relevant Orders    Cologuard Screening (Multitarget Stool DNA)      PVD (peripheral vascular disease)        Relevant Orders    Ambulatory referral/consult to Cardiology      Cervicalgia        Relevant Orders    MRI Cervical Spine Without Contrast      Head trauma, initial encounter        Relevant Orders    CT Head Without Contrast      Right hip pain        Relevant Medications    HYDROcodone-acetaminophen (NORCO)  mg per tablet (Start on 6/12/2025)         1. Cervical radiculopathy due to  degenerative joint disease of spine  - MRI Cervical Spine Without Contrast; Future  - HYDROcodone-acetaminophen (NORCO)  mg per tablet; Take 1 tablet by mouth every 8 (eight) hours as needed for Pain.  Dispense: 90 tablet; Refill: 0  - MRI Cervical Spine Without Contrast  - Rx Norco 10 mg po x q8hrs p.r.n. refilled today with close monitoring.  reviewed today. Stretching exercises encouraged. Notify M.D. or ER if symptoms persist or worsen, SI/HI, temp greater than 100.4, or any acute illness.      2. Lumbar radiculopathy, chronic  - MRI Lumbar Spine Without Contrast; Future  - HYDROcodone-acetaminophen (NORCO)  mg per tablet; Take 1 tablet by mouth every 8 (eight) hours as needed for Pain.  Dispense: 90 tablet; Refill: 0  - MRI Lumbar Spine Without Contrast  - Rx Norco 10 mg po x q8hrs p.r.n. refilled today with close monitoring.  reviewed today. Stretching exercises encouraged. Notify M.D. or ER if symptoms persist or worsen, SI/HI, temp greater than 100.4, or any acute illness.      3. Chronic left shoulder pain  - MRI Shoulder Without Contrast Left; Future  - HYDROcodone-acetaminophen (NORCO)  mg per tablet; Take 1 tablet by mouth every 8 (eight) hours as needed for Pain.  Dispense: 90 tablet; Refill: 0  - MRI Shoulder Without Contrast Left  - Rx Norco 10 mg po x q8hrs p.r.n. refilled today with close monitoring.  reviewed today. Stretching exercises encouraged. Notify M.D. or ER if symptoms persist or worsen, SI/HI, temp greater than 100.4, or any acute illness.      4. Injury of head, sequela  - CT Head Without Contrast; Future  - CT Head Without Contrast  - Fall precautions encouraged.    5. Opioid dependence, uncomplicated  - Same as #1.     6. Screening for colon cancer  - Cologuard Screening (Multitarget Stool DNA); Future  - Cologuard Screening (Multitarget Stool DNA)    7. PVD (peripheral vascular disease)  - Ambulatory referral/consult to Cardiology; Future for cardiac  evaluation; risk factor reduction encouraged; smoking cessation encouraged.     8. Cervicalgia  - MRI Cervical Spine Without Contrast; Future  - MRI Cervical Spine Without Contrast  - Same as #1.     9. Head trauma, initial encounter  - CT Head Without Contrast; Future  - CT Head Without Contrast  - Same as #4.     10. Right hip pain  - HYDROcodone-acetaminophen (NORCO)  mg per tablet; Take 1 tablet by mouth every 8 (eight) hours as needed for Pain.  Dispense: 90 tablet; Refill: 0  - Rx Norco 10 mg po x q8hrs p.r.n. refilled today with close monitoring.  reviewed today. Stretching exercises encouraged. Notify M.D. or ER if symptoms persist or worsen, SI/HI, temp greater than 100.4, or any acute illness.        Yogesh was seen today for follow-up and neck pain.    Diagnoses and all orders for this visit:    Cervical radiculopathy due to degenerative joint disease of spine  -     MRI Cervical Spine Without Contrast; Future  -     HYDROcodone-acetaminophen (NORCO)  mg per tablet; Take 1 tablet by mouth every 8 (eight) hours as needed for Pain.  -     MRI Cervical Spine Without Contrast    Lumbar radiculopathy, chronic  -     MRI Lumbar Spine Without Contrast; Future  -     HYDROcodone-acetaminophen (NORCO)  mg per tablet; Take 1 tablet by mouth every 8 (eight) hours as needed for Pain.  -     MRI Lumbar Spine Without Contrast    Chronic left shoulder pain  -     MRI Shoulder Without Contrast Left; Future  -     HYDROcodone-acetaminophen (NORCO)  mg per tablet; Take 1 tablet by mouth every 8 (eight) hours as needed for Pain.  -     MRI Shoulder Without Contrast Left    Injury of head, sequela  -     CT Head Without Contrast; Future  -     CT Head Without Contrast    Opioid dependence, uncomplicated    Screening for colon cancer  -     Cologuard Screening (Multitarget Stool DNA); Future  -     Cologuard Screening (Multitarget Stool DNA)    PVD (peripheral vascular disease)  -     Ambulatory  referral/consult to Cardiology; Future    Cervicalgia  -     MRI Cervical Spine Without Contrast; Future  -     MRI Cervical Spine Without Contrast    Head trauma, initial encounter  -     CT Head Without Contrast; Future  -     CT Head Without Contrast    Right hip pain  -     HYDROcodone-acetaminophen (NORCO)  mg per tablet; Take 1 tablet by mouth every 8 (eight) hours as needed for Pain.          Medication List with Changes/Refills   Current Medications    ASPIRIN (ECOTRIN) 81 MG EC TABLET    Take 81 mg by mouth once daily.       Start Date: --        End Date: --    CHOLECALCIFEROL, VITAMIN D3, (VITAMIN D3) 50 MCG (2,000 UNIT) TAB    Take by mouth once daily.       Start Date: --        End Date: --    CLOPIDOGREL (PLAVIX) 75 MG TABLET    Take 75 mg by mouth once daily.       Start Date: --        End Date: --    COMBIVENT RESPIMAT  MCG/ACTUATION INHALER    SMARTSI Spray(s) By Mouth 3 Times Daily       Start Date: 2023End Date: --    CYCLOBENZAPRINE (FLEXERIL) 5 MG TABLET    Take 5 mg by mouth every 8 (eight) hours as needed.       Start Date: 2024  End Date: --    DULOXETINE (CYMBALTA) 30 MG CAPSULE    Take 1 capsule (30 mg total) by mouth once daily.       Start Date: 2024 End Date: 2024    FERROUS GLUCONATE (FERGON) 324 MG TABLET    Take 324 mg by mouth daily with breakfast.       Start Date: --        End Date: --     MG TABLET    Take 600 mg by mouth every 8 (eight) hours as needed.       Start Date: 2023End Date: --    PROMETHAZINE-DEXTROMETHORPHAN (PROMETHAZINE-DM) 6.25-15 MG/5 ML SYRP    Take 5 mLs by mouth every 6 (six) hours as needed (cough).       Start Date: 2025 End Date: --   Changed and/or Refilled Medications    Modified Medication Previous Medication    HYDROCODONE-ACETAMINOPHEN (NORCO)  MG PER TABLET HYDROcodone-acetaminophen (NORCO)  mg per tablet       Take 1 tablet by mouth every 8 (eight) hours as needed for Pain.    Take 1  tablet by mouth every 8 (eight) hours as needed for Pain.       Start Date: 2025 End Date: --    Start Date: 2025 End Date: 6/10/2025          Follow up in about 4 weeks (around 2025) for Wellness.          [1]   Social History  Socioeconomic History    Marital status: Single   Tobacco Use    Smoking status: Some Days     Current packs/day: 0.00     Types: Cigarettes     Last attempt to quit:      Years since quittin.4    Smokeless tobacco: Never    Tobacco comments:     3 cigarettes a week   Substance and Sexual Activity    Alcohol use: Yes     Alcohol/week: 1.0 standard drink of alcohol     Types: 1 Cans of beer per week     Comment: 1 a week    Drug use: Never    Sexual activity: Not Currently   Social History Narrative    ** Merged History Encounter **          Social Drivers of Health     Financial Resource Strain: High Risk (2025)    Received from [facility] SDOH Screening     In the past year, have you been unable to get any of the following when you really needed them? choose all that apply.: Internet     In the past year, have you been unable to get any of the following when you really needed them? choose all that apply.: Medicine or health care     In the past year, have you been unable to get any of the following when you really needed them? choose all that apply.: Clothing   Food Insecurity: High Risk (2025)    Received from [facility] SDOH Screening     In the past 2 months, did you or others you live with eat smaller meals or skip meals because you didn't have money for food?: Yes   Transportation Needs: No Transportation Needs (2024)    PRAPARE - Transportation     Lack of Transportation (Medical): No     Lack of Transportation (Non-Medical): No   Physical Activity: Inactive (2024)    Exercise Vital Sign     Days of Exercise per Week: 0 days     Minutes of Exercise per Session: 10 min   Stress: No Stress Concern Present (2024)     [facility] - Occupational Stress Questionnaire     Feeling of Stress : Not at all   Housing Stability: High Risk (4/22/2025)    Received from [facility] SDOH Screening     In the past year, have you been unable to get any of the following when you really needed them? choose all that apply.: Utilities (electric, gas, and water)

## 2025-07-10 ENCOUNTER — OFFICE VISIT (OUTPATIENT)
Dept: FAMILY MEDICINE | Facility: CLINIC | Age: 70
End: 2025-07-10
Payer: MEDICARE

## 2025-07-10 VITALS
SYSTOLIC BLOOD PRESSURE: 99 MMHG | HEIGHT: 68 IN | OXYGEN SATURATION: 97 % | WEIGHT: 131.13 LBS | BODY MASS INDEX: 19.87 KG/M2 | HEART RATE: 84 BPM | DIASTOLIC BLOOD PRESSURE: 59 MMHG

## 2025-07-10 DIAGNOSIS — M47.22 CERVICAL RADICULOPATHY DUE TO DEGENERATIVE JOINT DISEASE OF SPINE: ICD-10-CM

## 2025-07-10 DIAGNOSIS — M54.16 LUMBAR RADICULOPATHY, CHRONIC: ICD-10-CM

## 2025-07-10 DIAGNOSIS — G89.29 CHRONIC LEFT SHOULDER PAIN: ICD-10-CM

## 2025-07-10 DIAGNOSIS — M25.512 CHRONIC PAIN OF BOTH SHOULDERS: ICD-10-CM

## 2025-07-10 DIAGNOSIS — Z87.891 FORMER SMOKER: ICD-10-CM

## 2025-07-10 DIAGNOSIS — F32.A DEPRESSION, UNSPECIFIED DEPRESSION TYPE: ICD-10-CM

## 2025-07-10 DIAGNOSIS — Z00.00 MEDICARE ANNUAL WELLNESS VISIT, SUBSEQUENT: Primary | ICD-10-CM

## 2025-07-10 DIAGNOSIS — M25.512 CHRONIC LEFT SHOULDER PAIN: ICD-10-CM

## 2025-07-10 DIAGNOSIS — G89.29 CHRONIC PAIN OF BOTH SHOULDERS: ICD-10-CM

## 2025-07-10 DIAGNOSIS — G89.29 CHRONIC NECK PAIN: ICD-10-CM

## 2025-07-10 DIAGNOSIS — M54.50 CHRONIC RIGHT-SIDED LOW BACK PAIN, UNSPECIFIED WHETHER SCIATICA PRESENT: ICD-10-CM

## 2025-07-10 DIAGNOSIS — F11.20 OPIOID DEPENDENCE, UNCOMPLICATED: ICD-10-CM

## 2025-07-10 DIAGNOSIS — M54.2 CHRONIC NECK PAIN: ICD-10-CM

## 2025-07-10 DIAGNOSIS — Z12.5 PROSTATE CANCER SCREENING: ICD-10-CM

## 2025-07-10 DIAGNOSIS — M25.551 RIGHT HIP PAIN: ICD-10-CM

## 2025-07-10 DIAGNOSIS — J44.9 CHRONIC OBSTRUCTIVE PULMONARY DISEASE, UNSPECIFIED COPD TYPE: ICD-10-CM

## 2025-07-10 DIAGNOSIS — G89.29 CHRONIC RIGHT-SIDED LOW BACK PAIN, UNSPECIFIED WHETHER SCIATICA PRESENT: ICD-10-CM

## 2025-07-10 DIAGNOSIS — M25.511 CHRONIC PAIN OF BOTH SHOULDERS: ICD-10-CM

## 2025-07-10 RX ORDER — HYDROCODONE BITARTRATE AND ACETAMINOPHEN 10; 325 MG/1; MG/1
1 TABLET ORAL EVERY 8 HOURS PRN
Qty: 90 TABLET | Refills: 0 | Status: SHIPPED | OUTPATIENT
Start: 2025-07-10

## 2025-07-10 RX ORDER — HYDROCODONE BITARTRATE AND ACETAMINOPHEN 10; 325 MG/1; MG/1
1 TABLET ORAL EVERY 8 HOURS PRN
Qty: 90 TABLET | Refills: 0 | Status: CANCELLED | OUTPATIENT
Start: 2025-07-10

## 2025-07-10 NOTE — PROGRESS NOTES
Patient ID: [id number]     Chief Complaint: Follow-up (Medication )      HPI:     Yogesh Soto is a 70 y.o. male here today for a Medicare Wellness. No other complaints today.     Well Adult History   The patient presents for well adult exam. The patient's general health status is described as good. The patient's diet is described as balanced. Exercise: occasional. Associated symptoms consist of denies weight loss, denies weight gain, denies fatigue, denies headache, denies hearing loss and denies vision changes. Additional pertinent history: last dental exam: 2019 (he has dental insurance and will schedule an appt. next available tomorrow) last eye exam: 2018 (wears reading eyeglasses, he has an appointment scheduled at [facility] on 06/21/2024), seat belt use, occasional caffeine use (coffee), tobacco use 2-3 cigarettes x several years, he quit smoking 2 months ago, denies lung cancer screening, no alcohol use. He refuses Tdap, Flu vaccine, RSV vaccine, COVID-19 vaccine, and Shingrix, risks of refusal discussed with patient voicing understanding. History of PAD s/p PTA right leg with Cardiology (Dr. Rutherford), controlled with Rx, no side effects, asymptomatic, compliant with followup as scheduled, but hasn't seen him in over a year, he was referred to Cardiology in [place]. Needs to set up appointment.   He denies family history colon cancer, he has Cologuard ordered, needs to complete. Patient complains of chronic neck, left shoulder, and low back x > 2 years. He reports that he rode horses back in the day and a horse fell on him in the past. 9/10 on pain scale, constant, worse with activity, pain is sharp and throbbing, worse with standing and raising his arm as well, he is taking Norco with improvement of pain, no side effects, he tried to get in with pain management without success, he needs a refill of Waterflow today. He has severe DJD C-spine, DJD L-spine, and left rotator cuff tear, but he refuses  surgery, risks of refusal discussed with patient and patient voices understanding. He has tried PT in the past without resolution of symptoms.   - Patient denies depression. He denies anxiety, SI/HI, AH/VH, or family history of mental health problems.   -  Patient is without any other complaints today.     denies Urinary leakage.  denies Recent falls or balance difficulty.   admits to Daily exercise or physical activity.  Denies Depression, stress, anxiety, or emotional lability.   denies The need for healthcare treatment including a cane/walker, blood pressure monitoring, or regular vision/hearing tests.     Health Maintenance         Date Due Completion Date    Lipid Panel 2024    Colorectal Cancer Screening 2025 (Originally 3/10/2001) ---    Shingles Vaccine (1 of 2) 07/10/2026 (Originally 3/10/2006) ---    Pneumococcal Vaccines (Age 50+) (1 of 2 - PCV) 07/10/2026 (Originally 3/10/1975) ---    TETANUS VACCINE 03/10/2029 3/10/2019            Vaccinations -   Immunization History   Administered Date(s) Administered    COVID-19 MRNA, LN-S PF (MODERNA HALF 0.25 ML DOSE) 2021    COVID-19 Vaccine 2021, 2021, 2021    COVID-19, MRNA, LN-S, PF (MODERNA FULL 0.5 ML DOSE) 2021, 2021, 2021    Tdap 03/10/2019        A separate E/M code has been provided to evaluate additional complaints that the patient would like addressed during the dedicated Medicare Wellness Exam.    Past Medical History:   Diagnosis Date    Cervical radiculopathy     Insomnia     Osteopenia     PVD (peripheral vascular disease)         Past Surgical History:   Procedure Laterality Date    CEREBRAL ANGIOGRAM          Social History     Socioeconomic History    Marital status: Single   Tobacco Use    Smoking status: Some Days     Current packs/day: 0.00     Types: Cigarettes     Last attempt to quit:      Years since quittin.5    Smokeless tobacco: Never    Tobacco comments:     3  cigarettes a week   Substance and Sexual Activity    Alcohol use: Yes     Alcohol/week: 1.0 standard drink of alcohol     Types: 1 Cans of beer per week     Comment: 1 a week    Drug use: Never    Sexual activity: Not Currently   Social History Narrative    ** Merged History Encounter **          Social Drivers of Health     Financial Resource Strain: High Risk (4/22/2025)    Received from [facility] SDOH Screening     In the past year, have you been unable to get any of the following when you really needed them? choose all that apply.: Internet     In the past year, have you been unable to get any of the following when you really needed them? choose all that apply.: Medicine or health care     In the past year, have you been unable to get any of the following when you really needed them? choose all that apply.: Clothing   Food Insecurity: High Risk (4/22/2025)    Received from [facility] SDOH Screening     In the past 2 months, did you or others you live with eat smaller meals or skip meals because you didn't have money for food?: Yes   Transportation Needs: No Transportation Needs (2/27/2024)    PRAPARE - Transportation     Lack of Transportation (Medical): No     Lack of Transportation (Non-Medical): No   Physical Activity: Inactive (2/27/2024)    Exercise Vital Sign     Days of Exercise per Week: 0 days     Minutes of Exercise per Session: 10 min   Stress: No Stress Concern Present (2/27/2024)    [employer] - Occupational Stress Questionnaire     Feeling of Stress : Not at all   Housing Stability: High Risk (4/22/2025)    Received from [facility] SDOH Screening     In the past year, have you been unable to get any of the following when you really needed them? choose all that apply.: Utilities (electric, gas, and water)        Current Outpatient Medications   Medication Instructions    aspirin (ECOTRIN) 81 mg, Daily    cholecalciferol, vitamin  "D3, (VITAMIN D3) 50 mcg (2,000 unit) Tab Daily    clopidogreL (PLAVIX) 75 mg, Daily    COMBIVENT RESPIMAT  mcg/actuation inhaler SMARTSI Spray(s) By Mouth 3 Times Daily    cyclobenzaprine (FLEXERIL) 5 mg, Every 8 hours PRN    DULoxetine (CYMBALTA) 30 mg, Oral, Daily    ferrous gluconate (FERGON) 324 mg, With breakfast    HYDROcodone-acetaminophen (NORCO)  mg per tablet 1 tablet, Oral, Every 8 hours PRN     mg, Every 8 hours PRN       Review of patient's allergies indicates:  No Known Allergies     Patient Care Team:  Rosalinda Montoya MD as PCP - General (Family Medicine)  Jasmin Fishman NP (Nurse Practitioner)  Mariaelena Saleh NP as Nurse Practitioner (Nurse Practitioner)  Skyla Fernandez LPN as Care Coordinator    Subjective:     Review of Systems    12 point review of systems conducted, negative except as stated in the history of present illness. See HPI for details.    Patient Reported Health Risk Assessments:       Objective:     Visit Vitals  BP (!) 99/59 (BP Location: Left arm, Patient Position: Sitting)   Pulse 84   Ht 5' 8" (1.727 m)   Wt 59.5 kg (131 lb 1.6 oz)   SpO2 97%   BMI 19.93 kg/m²       Physical Exam    General: Alert and oriented, No acute distress.  Head: Normocephalic, Atraumatic.  Eye: Pupils are equal, round and reactive to light, Extraocular movements are intact, Sclera non-icteric.  Ears/Nose/Throat: Normal, Mucosa moist,Clear.  Neck/Thyroid: Supple, Non-tender, No carotid bruit, No palpable thyromegaly or thyroid nodule, No lymphadenopathy, No JVD, Full range of motion.  Respiratory: Clear to auscultation bilaterally; No wheezes, rales or rhonchi, Non-labored respirations, Symmetrical chest wall expansion.  Cardiovascular: Regular rate and rhythm, S1/S2 normal, No murmurs, rubs or gallops.  Gastrointestinal: Soft, Non-tender, Non-distended, Normal bowel sounds, No palpable organomegaly.  Musculoskeletal: Normal range of motion.  Integumentary: Warm, " Dry, Intact, No suspicious lesions or rashes.  Extremities: No clubbing, cyanosis or edema  Neurologic: No focal deficits, Cranial Nerves II-XII are grossly intact, Motor strength normal upper and lower extremities, Sensory exam intact.  Psychiatric: Normal interaction, Coherent speech, Euthymic mood, Appropriate affect     Labs Reviewed:     Chemistry:  Lab Results   Component Value Date     07/09/2020    K 4.6 07/09/2020    BUN 13 07/09/2020    CREATININE 0.90 07/09/2020    CALCIUM 9.5 07/09/2020    ALKPHOS 64 07/09/2020    ALBUMIN 5.0 (H) 07/09/2020    BILIDIR 0.10 01/02/2020    IBILI 0.30 01/02/2020    AST 42 (H) 07/09/2020    ALT 10 07/09/2020    OBRFNMLM06QU 26.2 (L) 07/09/2020    TSH 1.510 11/21/2019    PSA 0.39 11/21/2019        Lab Results   Component Value Date    HGBA1C 4.4 11/21/2019        Hematology:  Lab Results   Component Value Date    WBC 5.7 07/09/2020    HGB 11.1 (L) 07/09/2020    HCT 37.9 07/09/2020     07/09/2020       Lipid Panel:  Lab Results   Component Value Date    CHOL 184 11/21/2019    HDL 54 11/21/2019     11/21/2019    TRIG 104 11/21/2019    TOTALCHOLEST 3.4 11/21/2019        Urine:  Lab Results   Component Value Date    APPEARANCEUA CLEAR 11/21/2019    PROTEINUA Negative 11/21/2019    LEUKOCYTESUR Negative 11/21/2019    RBCUA NONE SEEN 11/21/2019    WBCUA NONE SEEN 11/21/2019    BACTERIA NONE SEEN 11/21/2019        Assessment:       ICD-10-CM ICD-9-CM   1. Medicare annual wellness visit, subsequent  Z00.00 V70.0   2. Chronic right-sided low back pain, unspecified whether sciatica present  M54.50 724.2    G89.29 338.29   3. Chronic pain of both shoulders  M25.511 719.41    G89.29 338.29    M25.512    4. Chronic neck pain  M54.2 723.1    G89.29 338.29   5. Depression, unspecified depression type  F32.A 311   6. Chronic obstructive pulmonary disease, unspecified COPD type  J44.9 496   7. Prostate cancer screening  Z12.5 V76.44   8. Former smoker  Z87.891 V15.82         Plan:       1. Medicare annual wellness visit, subsequent  -     Lipid Panel; Future; Expected date: 07/10/2025  -     TSH; Future; Expected date: 07/10/2025  -     Hemoglobin A1C; Future; Expected date: 07/10/2025  -     Urinalysis, Reflex to Urine Culture  -     CBC Auto Differential; Future; Expected date: 07/10/2025  -     Comprehensive Metabolic Panel; Future; Expected date: 07/10/2025  AAA Screening -  (65-75)- Declines  Lung Cancer-(50-80) Smoker/ Ex smoker- declines  Prostate Cancer Screening (50-74)- Last PSA- due today  Colon Cancer Screening(45-75) -Cologuard pending completion  Eye Exam - Recommend annually.   Dental Exam - Recommend biannual exams.     Diet discussed (healthy food choices, reduce portions and overall calorie intake)  Exercise 30-45 minutes 5x per week  Avoid excessive alcohol and tobacco if taking  Immunizations dicussed  Monthly testicular exam recommended  Preventative exams discussed.       2. Chronic right-sided low back pain, unspecified whether sciatica present    reviewed   Norco filled by MD   Stretching exercises encouraged. Notify M.D. or ER if symptoms persist or worsen, SI/HI, temp greater than 100.4, or any acute illness.    3. Chronic pain of both shoulders      Same as #2  4. Chronic neck pain   Same as #2  5. Depression, unspecified depression type  Stable without medication  Read positive daily meditations, avoid negative media, set healthy boundaries.  Exercise daily, keep consistent sleep pattern, eat a healthy diet.  Establish good social support, make changes to reduce stress.  Reports any symptoms of suicidal/homicidal ideations or self harm immediately, if clinic is closed go to nearest emergency room.    6. Chronic obstructive pulmonary disease, unspecified COPD type              Stable  Use inhalers as prescribed (rinse mouth after use of steroid inhalers). Use long term inhalers daily and rescue inhaler as needed.  Avoid triggers (high humidity, strong  odors, chemical fumes).  Report signs of upper respiratory infection immediately for early treatment.  Flu shot recommended yearly.  Practice abdominal breathing. Eat smaller, more frequent meals.  Smoking Cessation encouraged.     7. Prostate cancer screening  -     PSA, Screening; Future; Expected date: 07/10/2026    8. Former smoker  Patient quit smoking 2 months ago.  Declines lung cancer screening    9. Cervical radiculopathy due to degenerative joint disease of spine  Same as #2  10. Lumbar radiculopathy, chronic  Same as #2  11.    Right hip pain      Same as #2    Fall Risk + Home Safety + Living Situation + Whisper Test + Depression Screen + CAGE + Cognitive Impairment Screen + ADL Screen + Timed Get Up and Go + Nutrition Screen + PAQ Screen + Health Risk Assessment all reviewed.   12. Opiod, dependence, uncomplicated  Same as #2               No data to display                  7/10/2025     3:40 PM 6/10/2025     2:15 PM 3/13/2025     1:45 PM 11/5/2024     9:45 AM 6/19/2024     2:30 PM 2/27/2024     3:30 PM 1/10/2024     1:00 PM   Fall Risk Assessment - Outpatient   Mobility Status Ambulatory Ambulatory Ambulatory Ambulatory Ambulatory Ambulatory Ambulatory   Number of falls 0 1 0 1 1 1 0   Identified as fall risk False False False False False False False         Over the last two weeks how often have you been bothered by little interest or pleasure in doing things: 0  Over the last two weeks how often have you been bothered by feeling down, depressed or hopeless: 0  PHQ-2 Total Score: 0     CVD Risk Factors - Reviewed  Obesity/Physical Activity -  Encouraged daily 30 minute physical activity x 5 days per week.    Opioid Screening: Patient medication list reviewed, patient is taking prescription opioids. Patient is not using additional opioids than prescribed. Patient at low risk of substance abuse based on this opioid use history.     Advance Care Planning  Advance Care Planning   Advanced Care Planning:  I offered to discuss Advanced Care Planning, which consists of how you would like to be cared for as you end the near of your natural life.  This includes how to pick a person who would make decisions for you if you were unable to make them for yourself, which is called a Medical Power of . These discussions include what kind of decisions you will make regarding life sustaining measures, such as ventilators and feeding tubes, when faced with a life limiting illness recorded on a living will that they will need to know.       The patient's Health Maintenance was reviewed and the following appears to be due at this time:   Health Maintenance Due   Topic Date Due    Lipid Panel  11/21/2024       Follow up in about 3 months (around 10/10/2025) for Chronic pain follow up. In addition to their scheduled follow up, the patient has also been instructed to follow up on as needed basis.     No future appointments.     Provided patient with a 5-10 year written screening schedule and personal prevention plan. Recommendations were developed using the USPSTF age appropriate recommendations. Education, counseling, and referrals were provided as needed. After Visit Summary printed and given to patient which includes a list of additional screenings\tests needed.    MASSIMO Kern

## 2025-08-07 DIAGNOSIS — M25.551 RIGHT HIP PAIN: ICD-10-CM

## 2025-08-07 DIAGNOSIS — M47.22 CERVICAL RADICULOPATHY DUE TO DEGENERATIVE JOINT DISEASE OF SPINE: ICD-10-CM

## 2025-08-07 DIAGNOSIS — M25.512 CHRONIC LEFT SHOULDER PAIN: ICD-10-CM

## 2025-08-07 DIAGNOSIS — G89.29 CHRONIC LEFT SHOULDER PAIN: ICD-10-CM

## 2025-08-07 DIAGNOSIS — M54.16 LUMBAR RADICULOPATHY, CHRONIC: ICD-10-CM

## 2025-08-07 RX ORDER — HYDROCODONE BITARTRATE AND ACETAMINOPHEN 10; 325 MG/1; MG/1
1 TABLET ORAL EVERY 8 HOURS PRN
Qty: 90 TABLET | Refills: 0 | Status: SHIPPED | OUTPATIENT
Start: 2025-08-07

## 2025-08-07 NOTE — TELEPHONE ENCOUNTER
Copied from CRM #[id number]. Topic: Medications - Medication Refill  >> Aug 7, 2025  1:46 PM Leydi wrote:  Who Called: Yogesh Soto    Refill or New Rx:Refill  RX Name and Strength: HYDROcodone-acetaminophen (NORCO)  mg per tablet  How is the patient currently taking it? (ex. 1XDay):  Is this a 30 day or 90 day RX:  Local or Mail Order:  List of preferred pharmacies on file (remove unneeded):  [id number] - [place], [place] - [address] AT [facility]      Ordering Provider:      Preferred Method of Contact: Phone Call  Patient's Preferred Phone Number on File: [phone]   Best Call Back Number, if different:  Additional Information:

## 2025-08-12 DIAGNOSIS — J44.9 CHRONIC OBSTRUCTIVE PULMONARY DISEASE, UNSPECIFIED COPD TYPE: ICD-10-CM

## 2025-08-12 RX ORDER — PROMETHAZINE HYDROCHLORIDE AND DEXTROMETHORPHAN HYDROBROMIDE 6.25; 15 MG/5ML; MG/5ML
5 SYRUP ORAL EVERY 6 HOURS PRN
Qty: 180 ML | Refills: 0 | OUTPATIENT
Start: 2025-08-12